# Patient Record
Sex: FEMALE | Race: WHITE | NOT HISPANIC OR LATINO | Employment: OTHER | URBAN - METROPOLITAN AREA
[De-identification: names, ages, dates, MRNs, and addresses within clinical notes are randomized per-mention and may not be internally consistent; named-entity substitution may affect disease eponyms.]

---

## 2017-03-22 ENCOUNTER — OFFICE VISIT (OUTPATIENT)
Dept: NEUROLOGY | Facility: CLINIC | Age: 82
End: 2017-03-22
Payer: MEDICARE

## 2017-03-22 VITALS
SYSTOLIC BLOOD PRESSURE: 110 MMHG | HEART RATE: 66 BPM | BODY MASS INDEX: 31.66 KG/M2 | DIASTOLIC BLOOD PRESSURE: 65 MMHG | WEIGHT: 167.56 LBS

## 2017-03-22 DIAGNOSIS — M53.86 SCIATICA ASSOCIATED WITH DISORDER OF LUMBAR SPINE: Primary | ICD-10-CM

## 2017-03-22 DIAGNOSIS — F98.4 STEREOTYPIES: ICD-10-CM

## 2017-03-22 PROCEDURE — 1159F MED LIST DOCD IN RCRD: CPT | Mod: GC,S$GLB,, | Performed by: PSYCHIATRY & NEUROLOGY

## 2017-03-22 PROCEDURE — 99214 OFFICE O/P EST MOD 30 MIN: CPT | Mod: GC,S$GLB,, | Performed by: PSYCHIATRY & NEUROLOGY

## 2017-03-22 PROCEDURE — 3074F SYST BP LT 130 MM HG: CPT | Mod: GC,S$GLB,, | Performed by: PSYCHIATRY & NEUROLOGY

## 2017-03-22 PROCEDURE — 3078F DIAST BP <80 MM HG: CPT | Mod: GC,S$GLB,, | Performed by: PSYCHIATRY & NEUROLOGY

## 2017-03-22 PROCEDURE — 1157F ADVNC CARE PLAN IN RCRD: CPT | Mod: GC,S$GLB,, | Performed by: PSYCHIATRY & NEUROLOGY

## 2017-03-22 PROCEDURE — 99999 PR PBB SHADOW E&M-EST. PATIENT-LVL III: CPT | Mod: PBBFAC,GC,, | Performed by: PSYCHIATRY & NEUROLOGY

## 2017-03-22 PROCEDURE — 1160F RVW MEDS BY RX/DR IN RCRD: CPT | Mod: GC,S$GLB,, | Performed by: PSYCHIATRY & NEUROLOGY

## 2017-03-22 NOTE — PROGRESS NOTES
Patient Name: Mary Waters  MRN: [id number]    CC: bilateral movement of feet    HPI: Mary Waters is a 85 y.o. female former  with PMhx of CVA, thyroid disease, HLD L side sciatica, presents due to abnormal shaking of both feet starting ~ 1 yr ago. Patient accompanies by her son states that it has not been getting worse but can be constant. Son states that he has noticed this since stroke in 10/2015. Patient received tPA and no evidence of diffusion restriction on MRI. Patient does have intracranial atherosclerosis and is on ASA and lipitor. MRI imaging shows significant small vessel diease that is involving bilateral frontal lobes. Patient has stereotyped movements involving her hands and feet that increase when in conversation. She also has some jaw involvement. Patient sit in wheelchair and does not ambulate much due to sciatic pain, and ppssible frontal lobe pathology???. Patient takes ditropan for urgency/incontinence. Family reports some decrease in short term memory recently, but patient is oriented x 4.       ROS:   Review of Systems   Constitutional: Negative for malaise/fatigue. Negative for weight loss.   HENT: Negative for hearing loss.   Eyes: Negative for blurred vision and double vision.   Respiratory: Negative for shortness of breath and stridor.   Cardiovascular: Negative for chest pain and palpitations.   Gastrointestinal: Negative for nausea, vomiting and constipation.   Genitourinary: Negative for frequency. + for urgency.   Musculoskeletal: Negative for joint pain. Negative for myalgias and falls.   Skin: Negative for rash.   Neurological: Negative for dizziness and tremors. Negative for focal weakness and seizures.   Endo/Heme/Allergies: Does not bruise/bleed easily.   Psychiatric/Behavioral: Negative for memory loss. Negative for depression and hallucinations. The patient is not nervous/anxious.      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       Medications    Current Outpatient Prescriptions:     alendronate (FOSAMAX) 70 MG tablet, TAKE 1 TABLET(70 MG) BY MOUTH EVERY 7 DAYS (Patient taking differently: TAKE 1 TABLET(70 MG) BY MOUTH EVERY 7 DAYS ON Saturday ), Disp: 12 tablet, Rfl: 12    aspirin 81 MG Chew, Take 1 tablet (81 mg total) by mouth once daily., Disp: , Rfl: 0    atorvastatin (LIPITOR) 80 MG tablet, Take 1 tablet (80 mg total) by mouth once daily., Disp: 30 tablet, Rfl: 6    calcium carbonate-vit D3-min 600 mg calcium- 400 unit Tab, Take 1 tablet by mouth 2 (two) times daily., Disp: , Rfl:     cephALEXin (KEFLEX) 250 MG capsule, Take 1 capsule (250 mg total) by mouth once daily., Disp: 30 capsule, Rfl: 11    conjugated estrogens (PREMARIN) vaginal cream, Use 0.5 -1 gram of estrogen cream in vagina twice a week, Disp: 30 g, Rfl: 3    CRANBERRY FRUIT CONCENTRATE (AZO CRANBERRY ORAL), Take 2 tablets by mouth once daily. , Disp: , Rfl:     escitalopram oxalate (LEXAPRO) 20 MG tablet, Take 1 tablet (20 mg total) by mouth once daily., Disp: 30 tablet, Rfl: 11    FOLIC ACID/MULTIVIT-MIN/LUTEIN (CENTRUM SILVER ORAL), Take 1 tablet by mouth once daily., Disp: , Rfl:     gabapentin (NEURONTIN) 100 MG capsule, TAKE ONE CAPSULE BY MOUTH THREE TIMES DAILY, Disp: 90 capsule, Rfl: 3    lansoprazole (PREVACID) 30 MG capsule, Take 1 capsule (30 mg total) by mouth once daily., Disp: 90 capsule, Rfl: 2    levothyroxine (SYNTHROID) 75 MCG tablet, TAKE 1 TABLET EVERY EVENING  AT  5PM, Disp: 90 tablet, Rfl: 4    miconazole nitrate  2% (MICOTIN) 2 % Oint, Apply topically 2 (two) times daily., Disp: , Rfl: 0    oxybutynin (DITROPAN-XL) 10 MG 24 hr tablet, Take 1 tablet (10 mg total) by mouth every evening., Disp: 30 tablet, Rfl: 11    psyllium (METAMUCIL) 0.52 gram capsule, Take 2 capsules by mouth once daily., Disp: , Rfl:     tramadol (ULTRAM) 50 mg tablet, Take 1 tablet (50 mg total) by mouth every 8 (eight) hours as needed for Pain (Moderate Pain)., Disp: 30 tablet, Rfl: 0    trolamine salicylate (ASPERCREME) 10 % cream, Apply topically 2 (two) times daily as needed. Apply to knees PRN, Disp: 60 g, Rfl: 0    Allergies  Review of patient's allergies indicates:   Allergen Reactions    Tizanidine Other (See Comments)     Daughter report this medication incapable of functioning.    Cortisone      Other reaction(s): Flushing (skin)    Procaine      Other reaction(s): palpitations    Penicillins Rash       Social History  Social History     Social History    Marital status:      Spouse name: N/A    Number of children: N/A    Years of education: N/A     Occupational History    Not on file.     Social History Main Topics    Smoking status: Never Smoker    Smokeless tobacco: Never Used    Alcohol use No    Drug use: No    Sexual activity: Not Currently     Other Topics Concern    Not on file     Social History Narrative       Family History  Family History   Problem Relation Age of Onset    Breast cancer Mother     Asthma Mother     Cancer Maternal Grandmother     Ovarian cancer Neg Hx     Cervical cancer Neg Hx     Endometrial cancer Neg Hx     Vaginal cancer Neg Hx        Physical Exam  /65  Pulse 66  Wt 76 kg (167 lb 8.8 oz)  LMP  (LMP Unknown)  BMI 31.66 kg/m2    General appearance: Well-developed, well-groomed.     Neurologic Exam: The patient is awake, alert and oriented. Language is fluent. Recent and remote memory are normal. Attention span and concentration are normal. Fund of knowledge is  appropriate.     Cranial nerves: pupils are round and reactive to light and accommodation. Visual fields are full to confrontation. Fundoscopic exam reveals sharp discs bilaterally, with good venous pulsations. Ocular motility is full in all cardinal positions of gaze. Facial sensation is normal to pinprick and light touch. Corneal reflexes are present bilaterally. Facial activation is symmetric. Hearing is normal bilaterally. Palate elevates symmetrically and gag reflex is intact bilaterally. Shoulder elevation is symmetric and full strength bilaterally. Tongue is midline and neck rotation strength is normal bilaterally. Neck range of motion is normal.     Motor examination of all extremities demonstrates normal bulk and tone in all four limbs. There are no atrophy or fasciculations. Strength is 5/5 in the upper and lower extremities bilaterally without pronator drift.     Sensory examination is normal to pinprick, vibration and proprioception in the upper and lower extremities bilaterally. Romberg is negative.    Deep tendon reflexes are 2+ and symmetric in the upper and lower extremities bilaterally. Toes are mute bilaterally.     Gait: Normal heel, toe, tandem, and casual gait.    Coordination: Finger to nose and heel to shin testing is normal in both upper and lower extremities. Rapid alternating movements are normal in both upper and lower extremities.     General exam  Cardiovascular: regular rate and rhythm with no murmurs, rubs or gallops. There are no carotid or vertebral artery bruits. Pulses in both upper and lower extremities are symmetric. There is no peripheral edema.   Head and neck: no cervical lymphadenopathy      Lab and Test Results    WBC   Date Value Ref Range Status   08/18/2016 6.14 3.90 - 12.70 K/uL Final   08/15/2016 6.41 3.90 - 12.70 K/uL Final   08/11/2016 6.63 3.90 - 12.70 K/uL Final     Hemoglobin   Date Value Ref Range Status   08/18/2016 11.2 (L) 12.0 - 16.0 g/dL Final   08/15/2016  11.6 (L) 12.0 - 16.0 g/dL Final   08/11/2016 11.8 (L) 12.0 - 16.0 g/dL Final     Hematocrit   Date Value Ref Range Status   08/18/2016 35.8 (L) 37.0 - 48.5 % Final   08/15/2016 36.6 (L) 37.0 - 48.5 % Final   08/11/2016 37.6 37.0 - 48.5 % Final     Platelets   Date Value Ref Range Status   08/18/2016 249 150 - 350 K/uL Final   08/15/2016 262 150 - 350 K/uL Final   08/11/2016 340 150 - 350 K/uL Final     Glucose   Date Value Ref Range Status   08/18/2016 84 70 - 110 mg/dL Final   08/15/2016 97 70 - 110 mg/dL Final   08/11/2016 85 70 - 110 mg/dL Final     Sodium   Date Value Ref Range Status   08/18/2016 138 136 - 145 mmol/L Final   08/15/2016 135 (L) 136 - 145 mmol/L Final   08/11/2016 139 136 - 145 mmol/L Final     Potassium   Date Value Ref Range Status   08/18/2016 4.5 3.5 - 5.1 mmol/L Final   08/15/2016 4.5 3.5 - 5.1 mmol/L Final   08/11/2016 4.5 3.5 - 5.1 mmol/L Final     Chloride   Date Value Ref Range Status   08/18/2016 107 95 - 110 mmol/L Final   08/15/2016 103 95 - 110 mmol/L Final   08/11/2016 104 95 - 110 mmol/L Final     CO2   Date Value Ref Range Status   08/18/2016 25 23 - 29 mmol/L Final   08/15/2016 24 23 - 29 mmol/L Final   08/11/2016 28 23 - 29 mmol/L Final     BUN, Bld   Date Value Ref Range Status   08/18/2016 14 8 - 23 mg/dL Final   08/15/2016 17 8 - 23 mg/dL Final   08/11/2016 14 8 - 23 mg/dL Final     Creatinine   Date Value Ref Range Status   08/18/2016 0.8 0.5 - 1.4 mg/dL Final   08/15/2016 0.9 0.5 - 1.4 mg/dL Final   08/11/2016 0.8 0.5 - 1.4 mg/dL Final     Calcium   Date Value Ref Range Status   08/18/2016 9.1 8.7 - 10.5 mg/dL Final   08/15/2016 9.6 8.7 - 10.5 mg/dL Final   08/11/2016 9.7 8.7 - 10.5 mg/dL Final     Magnesium   Date Value Ref Range Status   08/18/2016 1.8 1.6 - 2.6 mg/dL Final   08/15/2016 2.2 1.6 - 2.6 mg/dL Final   08/11/2016 2.2 1.6 - 2.6 mg/dL Final     Phosphorus   Date Value Ref Range Status   08/18/2016 3.5 2.7 - 4.5 mg/dL Final   08/15/2016 4.3 2.7 - 4.5 mg/dL Final    08/11/2016 3.8 2.7 - 4.5 mg/dL Final     Alkaline Phosphatase   Date Value Ref Range Status   07/27/2016 103 55 - 135 U/L Final   10/08/2015 62 55 - 135 U/L Final   10/06/2015 64 55 - 135 U/L Final     ALT   Date Value Ref Range Status   07/27/2016 16 10 - 44 U/L Final   10/08/2015 19 10 - 44 U/L Final   10/06/2015 18 10 - 44 U/L Final     AST   Date Value Ref Range Status   07/27/2016 36 10 - 40 U/L Final   10/08/2015 30 10 - 40 U/L Final   10/06/2015 24 10 - 40 U/L Final         Images: MRI Brain- small vessel disease with frontal lobe predominance     Independently reviewed     Other Tests    Assessment and Plan    Mary Waters is a 85 y.o. female with small vessel disease with stereotypy's with fidgeting in both hands and both feet. No evidence of parkinsonism. Also has gait disturbance, and urinary incontinence. No evidence of NPH on MRI.     Can consider Aricept in future   RTC mvmt disorders in 3 months- June 27th  PT for back and arm          Maddison Higgins MD  Neurology Resident   [facility] [address] [place] [address]  Pager: [phone]

## 2017-03-23 ENCOUNTER — TELEPHONE (OUTPATIENT)
Dept: NEUROLOGY | Facility: CLINIC | Age: 82
End: 2017-03-23

## 2017-03-28 NOTE — PROGRESS NOTES
Patient seen and examined.  I agree with the history, exam, assessment and plan within the resident's note as stated above.  Note has been edited by me to reflect my work and changes.    Curt Bocanegra MD, MPH  Division of Movement and Memory Disorders  [facility]

## 2017-04-24 ENCOUNTER — TELEPHONE (OUTPATIENT)
Dept: INTERNAL MEDICINE | Facility: CLINIC | Age: 82
End: 2017-04-24

## 2017-04-24 NOTE — TELEPHONE ENCOUNTER
----- Message from Kari Cox sent at 4/24/2017  4:22 PM CDT -----  Contact: self [phone]  Patient is returning a phone call.  Who left a message for the patient: Sarah   Does patient know what this is regarding:  Not sure   Comments:

## 2017-04-24 NOTE — TELEPHONE ENCOUNTER
Spoke to pt, she is asking if it's OK to take Magnilife tablets for her sciatica pain? They're made for back/leg pain.     Active ingredients:  Capsicum annuum 6X HPUS  Colocynthis 6X HPUS  Gnaphalium polycephalum 6X HPUS  Magnesia phosphorica 6X HPUS    Inactive ingredients:  Lactose  Magnesium Stearate  Microcrystalline Cellulose    Please advise

## 2017-04-24 NOTE — TELEPHONE ENCOUNTER
With all of those primarily being Herbal, I really do not have any experience to base a recommendation on.

## 2017-04-24 NOTE — TELEPHONE ENCOUNTER
Called pt, no answer- unable to leave message. A recording came over about being connected w/pt but then would disconnect me.

## 2017-04-24 NOTE — TELEPHONE ENCOUNTER
----- Message from Shelly Sears sent at 4/24/2017  1:23 PM CDT -----  Contact: call  [phone]  Patient is returning a phone call.  Who left a message for the patient: may  Does patient know what this is regarding:    Comments:

## 2017-04-24 NOTE — TELEPHONE ENCOUNTER
----- Message from Adriana Kearney sent at 4/24/2017 10:20 AM CDT -----  Contact: Pt at [phone]  Pt requesting a call back regarding [facility].

## 2017-06-12 ENCOUNTER — TELEPHONE (OUTPATIENT)
Dept: NEUROLOGY | Facility: CLINIC | Age: 82
End: 2017-06-12

## 2017-06-12 NOTE — TELEPHONE ENCOUNTER
----- Message from Pam Diane sent at 6/12/2017 11:11 AM CDT -----  Contact: pt's daughter gris [phone]  Pt's daughter Gris requests to schedule an appointment the week of July 10 th around 11:00 am. She can be reached at [phone].

## 2017-06-12 NOTE — TELEPHONE ENCOUNTER
Advised Ms. Landry that Dr. Higgins's clinic schedule is still being finalized for July and we would be able to schedule the patient by the beginning of next week.

## 2017-06-19 ENCOUNTER — PATIENT MESSAGE (OUTPATIENT)
Dept: INTERNAL MEDICINE | Facility: CLINIC | Age: 82
End: 2017-06-19

## 2017-06-20 NOTE — TELEPHONE ENCOUNTER
Pt daughter states [facility] is giving pt 1,000mg of gabapentin 3 times daily. We have it on record she is only supposed to take 100mg daily. Pt daughter is concerned this is too much. Please see portal message chain from daughter and advise.

## 2017-07-26 ENCOUNTER — OFFICE VISIT (OUTPATIENT)
Dept: INTERNAL MEDICINE | Facility: CLINIC | Age: 82
End: 2017-07-26
Payer: MEDICARE

## 2017-07-26 VITALS
OXYGEN SATURATION: 94 % | DIASTOLIC BLOOD PRESSURE: 78 MMHG | SYSTOLIC BLOOD PRESSURE: 124 MMHG | HEART RATE: 64 BPM | HEIGHT: 60 IN

## 2017-07-26 DIAGNOSIS — K21.9 GASTROESOPHAGEAL REFLUX DISEASE WITHOUT ESOPHAGITIS: ICD-10-CM

## 2017-07-26 DIAGNOSIS — M79.602 PAIN IN BOTH UPPER EXTREMITIES: ICD-10-CM

## 2017-07-26 DIAGNOSIS — E03.4 HYPOTHYROIDISM DUE TO ACQUIRED ATROPHY OF THYROID: Primary | ICD-10-CM

## 2017-07-26 DIAGNOSIS — M48.061 DEGENERATIVE LUMBAR SPINAL STENOSIS: ICD-10-CM

## 2017-07-26 DIAGNOSIS — R53.81 DEBILITY: ICD-10-CM

## 2017-07-26 DIAGNOSIS — M79.601 PAIN IN BOTH UPPER EXTREMITIES: ICD-10-CM

## 2017-07-26 PROCEDURE — 99499 UNLISTED E&M SERVICE: CPT | Mod: S$GLB,,, | Performed by: INTERNAL MEDICINE

## 2017-07-26 PROCEDURE — 99397 PER PM REEVAL EST PAT 65+ YR: CPT | Mod: S$GLB,,, | Performed by: INTERNAL MEDICINE

## 2017-07-26 PROCEDURE — 99999 PR PBB SHADOW E&M-EST. PATIENT-LVL III: CPT | Mod: PBBFAC,,, | Performed by: INTERNAL MEDICINE

## 2017-07-26 RX ORDER — LANSOPRAZOLE 30 MG/1
30 CAPSULE, DELAYED RELEASE ORAL DAILY
Qty: 90 CAPSULE | Refills: 3 | Status: SHIPPED | OUTPATIENT
Start: 2017-07-26 | End: 2018-07-24 | Stop reason: SDUPTHER

## 2017-07-26 RX ORDER — GABAPENTIN 600 MG/1
600 TABLET ORAL 3 TIMES DAILY
Qty: 90 TABLET | Refills: 11 | Status: SHIPPED | OUTPATIENT
Start: 2017-07-26 | End: 2018-07-24 | Stop reason: SDUPTHER

## 2017-07-26 NOTE — PROGRESS NOTES
Subjective:       Patient ID: Mary Waters is a 86 y.o. female.    Chief Complaint: Annual Exam    PI: Patient living in a nursing facility comes in for annual exam.  Doctors there did a chest x-ray and chemistry which were stable.  In reviewing the patient's meds she is off of Prevacid and would like to go back on it.  She's having more reflux.  She is on preventive antibiotics for UTIs.  Chronic meds for pain and neuropathy including gabapentin, Aspercreme, tramadol when necessary and lidocaine patches.  Appetite is stable.  She does have some incontinence but she says she is well cared for from the hygiene standpoint.      Review of Systems   Constitutional: Negative for appetite change, chills and fever.   HENT: Negative for sore throat.    Respiratory: Negative for cough, shortness of breath and wheezing.    Cardiovascular: Negative for chest pain and leg swelling.   Gastrointestinal: Negative for abdominal pain, constipation and diarrhea.   Genitourinary: Negative for difficulty urinating.        Incontinence, intermittent UTIs   Musculoskeletal: Positive for arthralgias (asking for more occupational physical therapy), back pain and gait problem. Negative for neck pain and neck stiffness.   Skin: Negative for rash.   Neurological: Positive for tremors and weakness.       Objective:      Physical Exam   Constitutional: She is oriented to person, place, and time. She appears well-developed and well-nourished. No distress.   Obese female in wheelchair   HENT:   Head: Normocephalic and atraumatic.   Mouth/Throat: Oropharynx is clear and moist. No oropharyngeal exudate.   Eyes: Conjunctivae are normal. Pupils are equal, round, and reactive to light. No scleral icterus.   Neck: Normal range of motion. Neck supple. No thyromegaly present.   Cardiovascular: Normal rate and regular rhythm.    No murmur heard.  Pulmonary/Chest: Effort normal and breath sounds normal. She has no wheezes.   Abdominal: Soft. Bowel  sounds are normal. She exhibits no distension. There is no tenderness.   Musculoskeletal: She exhibits tenderness (shoulders are stiff, tender.  Low back stiffness and tender).   Lymphadenopathy:     She has no cervical adenopathy.   Neurological: She is alert and oriented to person, place, and time.   Skin: No rash noted.   Psychiatric: She has a normal mood and affect.       Assessment:       1. Hypothyroidism due to acquired atrophy of thyroid    2. Gastroesophageal reflux disease without esophagitis    3. Pain in both upper extremities    4. Debility    5. Degenerative lumbar spinal stenosis        Plan:       Mary was seen today for annual exam.    Diagnoses and all orders for this visit:    Hypothyroidism due to acquired atrophy of thyroid    Gastroesophageal reflux disease without esophagitis    Pain in both upper extremities  -     Ambulatory Referral to Physical/Occupational Therapy    Debility    Degenerative lumbar spinal stenosis    Other orders  -     gabapentin (NEURONTIN) 600 MG tablet; Take 1 tablet (600 mg total) by mouth 3 (three) times daily.  -     lansoprazole (PREVACID) 30 MG capsule; Take 1 capsule (30 mg total) by mouth once daily.        follow-up in a few months

## 2017-11-07 DIAGNOSIS — N39.0 RECURRENT UTI: ICD-10-CM

## 2017-11-07 RX ORDER — CEPHALEXIN 250 MG/1
CAPSULE ORAL
Qty: 30 CAPSULE | Refills: 0 | Status: SHIPPED | OUTPATIENT
Start: 2017-11-07

## 2017-11-07 NOTE — TELEPHONE ENCOUNTER
Spoke with pt's daughter regarding scheduling her mom for a yearly follow up, daughter stated mom is in a nursing home and she needs to arrange transportation for the visit, she will look at her schedule this afternoon and is requesting someone to call her back in order to schedule the appointment.

## 2017-11-07 NOTE — TELEPHONE ENCOUNTER
----- Message from Leidy Jean NP sent at 11/7/2017 11:03 AM CST -----  Patient needs a f/u appt. With Chen-- she has not been here for a year    Thanks,  Leidy

## 2017-11-09 ENCOUNTER — TELEPHONE (OUTPATIENT)
Dept: UROGYNECOLOGY | Facility: CLINIC | Age: 82
End: 2017-11-09

## 2018-03-15 ENCOUNTER — PATIENT MESSAGE (OUTPATIENT)
Dept: INTERNAL MEDICINE | Facility: CLINIC | Age: 83
End: 2018-03-15

## 2018-03-15 DIAGNOSIS — R53.81 DEBILITY: ICD-10-CM

## 2018-03-15 DIAGNOSIS — K21.9 GASTROESOPHAGEAL REFLUX DISEASE WITHOUT ESOPHAGITIS: ICD-10-CM

## 2018-03-15 DIAGNOSIS — M48.061 DEGENERATIVE LUMBAR SPINAL STENOSIS: Primary | ICD-10-CM

## 2018-04-03 ENCOUNTER — PES CALL (OUTPATIENT)
Dept: ADMINISTRATIVE | Facility: CLINIC | Age: 83
End: 2018-04-03

## 2018-04-24 ENCOUNTER — TELEPHONE (OUTPATIENT)
Dept: ADMINISTRATIVE | Facility: CLINIC | Age: 83
End: 2018-04-24

## 2018-05-14 ENCOUNTER — TELEPHONE (OUTPATIENT)
Dept: ADMINISTRATIVE | Facility: CLINIC | Age: 83
End: 2018-05-14

## 2018-07-20 ENCOUNTER — OFFICE VISIT (OUTPATIENT)
Dept: INTERNAL MEDICINE | Facility: CLINIC | Age: 83
End: 2018-07-20
Payer: MEDICARE

## 2018-07-20 ENCOUNTER — PATIENT MESSAGE (OUTPATIENT)
Dept: INTERNAL MEDICINE | Facility: CLINIC | Age: 83
End: 2018-07-20

## 2018-07-20 ENCOUNTER — HOSPITAL ENCOUNTER (OUTPATIENT)
Dept: RADIOLOGY | Facility: HOSPITAL | Age: 83
Discharge: HOME | End: 2018-07-20
Attending: INTERNAL MEDICINE
Payer: MEDICARE

## 2018-07-20 VITALS
WEIGHT: 200 LBS | HEART RATE: 70 BPM | OXYGEN SATURATION: 98 % | TEMPERATURE: 98 F | SYSTOLIC BLOOD PRESSURE: 124 MMHG | BODY MASS INDEX: 39.06 KG/M2 | DIASTOLIC BLOOD PRESSURE: 62 MMHG

## 2018-07-20 DIAGNOSIS — R05.9 COUGH: ICD-10-CM

## 2018-07-20 DIAGNOSIS — Z00.00 ROUTINE PHYSICAL EXAMINATION: Primary | ICD-10-CM

## 2018-07-20 DIAGNOSIS — R53.81 DEBILITY: ICD-10-CM

## 2018-07-20 DIAGNOSIS — R06.2 WHEEZING: ICD-10-CM

## 2018-07-20 DIAGNOSIS — R26.81 GAIT INSTABILITY: ICD-10-CM

## 2018-07-20 DIAGNOSIS — M48.061 DEGENERATIVE LUMBAR SPINAL STENOSIS: ICD-10-CM

## 2018-07-20 PROCEDURE — 99397 PER PM REEVAL EST PAT 65+ YR: CPT | Mod: S$GLB,,, | Performed by: INTERNAL MEDICINE

## 2018-07-20 PROCEDURE — 71045 X-RAY EXAM CHEST 1 VIEW: CPT | Mod: 26,,, | Performed by: RADIOLOGY

## 2018-07-20 PROCEDURE — 71045 X-RAY EXAM CHEST 1 VIEW: CPT | Mod: TC

## 2018-07-20 PROCEDURE — 99999 PR PBB SHADOW E&M-EST. PATIENT-LVL IV: CPT | Mod: PBBFAC,,, | Performed by: INTERNAL MEDICINE

## 2018-07-20 NOTE — MEDICAL/APP STUDENT
Subjective:       Patient ID: Mary Waters is a 87 y.o. female.    Chief Complaint: Follow-up and Cough    HPI  Review of Systems   Constitutional: Negative.    HENT: Negative.    Respiratory: Positive for cough, shortness of breath and wheezing.         Cough productive of white phlegm  SOB described with exertion  Expiratory wheeze present most days, not a new symptom   Cardiovascular: Negative.    Gastrointestinal: Negative.    Endocrine: Negative.    Genitourinary: Negative for dysuria.        Overflow incontinence - can't stop, occurs couple of times a day   Musculoskeletal: Negative.    Neurological: Negative.    Hematological: Negative.    Psychiatric/Behavioral: Negative.        Objective:      Physical Exam   Constitutional: She is oriented to person, place, and time. She appears well-developed and well-nourished. No distress.   HENT:   Head: Normocephalic and atraumatic.   Neck: Normal range of motion. Neck supple.   Cardiovascular: Normal rate, regular rhythm and normal heart sounds.    Pulmonary/Chest: Effort normal. No respiratory distress. She has wheezes. She has rales.   Abdominal: Soft. Bowel sounds are normal.   Musculoskeletal: Normal range of motion.   Neurological: She is alert and oriented to person, place, and time.   Skin: Skin is warm and dry. Capillary refill takes less than 2 seconds.   Psychiatric: She has a normal mood and affect. Her behavior is normal. Judgment and thought content normal.       Assessment:       Ms Rene is here today to request a referral for Physical Therapy.   Plan:

## 2018-07-20 NOTE — PROGRESS NOTES
Subjective:       Patient ID: Mary Waters is a 87 y.o. female.    Chief Complaint: Follow-up and Cough    HPI:  87-year-old attended by her son and a caretaker here for cough and follow-up to consider physical therapy and occupational therapy.  The patient lives in health care facility but did not feel that the physical therapy and occupational therapy was adequate so few months ago had requested that I assist with home health to therefore get PT and OT.  I did paperwork and we faxed it appropriately but the patient and son are not certain that anyone cane to do this or it was not adequate.  They would like to do it again.  I explained that sometimes PT and OT or stopped when the patient is not making progress or if with can be accomplished can be done through the present facility.  They would like to proceed again so I will redo the order and see if that goes through or determine more about the evaluation.  Patient with chronic degenerative lumbar spinal stenosis, debility, constipation, reflux, mild memory impairment.  She has had a chronic cough for a while but it may be worse.  No productive sputum.  A gets better if she blows her nose.  Cough drops work.  No coughing while eating.  She has had some urinary infections in the past but says the hygiene is much better in her present facility and therefore she has not had any issues.      Cough   Associated symptoms include myalgias and postnasal drip. Pertinent negatives include no chest pain, chills, fever, headaches, rash, sore throat, shortness of breath or wheezing.     Review of Systems   Constitutional: Negative for appetite change, chills and fever.   HENT: Positive for postnasal drip. Negative for nosebleeds, sinus pain and sore throat.    Eyes: Negative for visual disturbance.   Respiratory: Positive for cough. Negative for shortness of breath and wheezing.    Cardiovascular: Negative for chest pain and leg swelling.   Gastrointestinal: Negative for  abdominal pain, constipation and diarrhea.   Genitourinary: Negative for difficulty urinating, dysuria and hematuria.   Musculoskeletal: Positive for arthralgias, back pain, gait problem and myalgias. Negative for neck pain and neck stiffness.   Skin: Negative for pallor and rash.   Neurological: Positive for weakness. Negative for headaches.   Psychiatric/Behavioral: Negative for dysphoric mood and suicidal ideas. The patient is not nervous/anxious.        Objective:      Physical Exam   Constitutional: She is oriented to person, place, and time. She appears well-developed and well-nourished. No distress.   Obese female, seated in wheelchair.  No obvious respiratory distress.  Occasional cough   HENT:   Head: Normocephalic and atraumatic.   Right Ear: External ear normal.   Left Ear: External ear normal.   Mouth/Throat: Oropharynx is clear and moist. No oropharyngeal exudate.   Eyes: Conjunctivae are normal. Pupils are equal, round, and reactive to light. No scleral icterus.   Neck: Normal range of motion. Neck supple. No thyromegaly present.   Cardiovascular: Normal rate and regular rhythm.    No murmur heard.  Pulmonary/Chest: Effort normal. She has wheezes (End expiratory and only heard in the upper central anterior chest region).   Abdominal: Soft. Bowel sounds are normal. She exhibits no distension. There is no tenderness.   Musculoskeletal: She exhibits tenderness (low back).   Lymphadenopathy:     She has no cervical adenopathy.   Neurological: She is alert and oriented to person, place, and time. Coordination abnormal.   Skin: No rash noted.   Psychiatric: She has a normal mood and affect.   Vitals reviewed.      Assessment:       1. Routine physical examination    2. Debility    3. Cough    4. Wheezing    5. Degenerative lumbar spinal stenosis    6. Gait instability        Plan:       Mary was seen today for follow-up and cough.    Diagnoses and all orders for this visit:    Routine physical  examination    Debility  -     Ambulatory referral to Home Health    Cough  -     X-Ray Chest PA And Lateral; Future    Wheezing  -     X-Ray Chest PA And Lateral; Future    Degenerative lumbar spinal stenosis  -     Ambulatory referral to Home Health    Gait instability  -     Ambulatory referral to Home Health        Respiratory symptoms may relate to allergies and postnasal drip.  Will get a chest x-ray.

## 2018-07-24 ENCOUNTER — TELEPHONE (OUTPATIENT)
Dept: INTERNAL MEDICINE | Facility: CLINIC | Age: 83
End: 2018-07-24

## 2018-07-24 RX ORDER — LANSOPRAZOLE 30 MG/1
CAPSULE, DELAYED RELEASE ORAL
Qty: 30 CAPSULE | Refills: 2 | Status: SHIPPED | OUTPATIENT
Start: 2018-07-24 | End: 2018-10-24 | Stop reason: SDUPTHER

## 2018-07-24 RX ORDER — GABAPENTIN 600 MG/1
TABLET ORAL
Qty: 90 TABLET | Refills: 3 | Status: SHIPPED | OUTPATIENT
Start: 2018-07-24 | End: 2018-11-29 | Stop reason: SDUPTHER

## 2018-07-24 NOTE — TELEPHONE ENCOUNTER
----- Message from Suzanne Sarabia sent at 7/24/2018 12:26 PM CDT -----  Contact: Sarhtak/son/ [phone]  Patient's son is requesting a call back from the doctor in regards to questions about patient medication.  Patient is in [facility].  Please advise, thank you

## 2018-07-25 NOTE — TELEPHONE ENCOUNTER
----- Message from Madelyn Morgan sent at 7/25/2018  2:22 PM CDT -----  Contact: Pt son Sarthak [phone]  Sarthak would like a call back from the nurse regarding going over the patients medications.

## 2018-08-31 ENCOUNTER — PES CALL (OUTPATIENT)
Dept: ADMINISTRATIVE | Facility: CLINIC | Age: 83
End: 2018-08-31

## 2018-10-24 RX ORDER — LANSOPRAZOLE 30 MG/1
CAPSULE, DELAYED RELEASE ORAL
Qty: 30 CAPSULE | Refills: 2 | Status: SHIPPED | OUTPATIENT
Start: 2018-10-24

## 2018-11-29 RX ORDER — GABAPENTIN 600 MG/1
TABLET ORAL
Qty: 90 TABLET | Refills: 3 | Status: SHIPPED | OUTPATIENT
Start: 2018-11-29

## 2019-01-06 ENCOUNTER — HOSPITAL ENCOUNTER (INPATIENT)
Facility: HOSPITAL | Age: 84
LOS: 3 days | Discharge: NURSING, REHAB OR LONG-TERM CARE | DRG: 202 | End: 2019-01-09
Attending: EMERGENCY MEDICINE | Admitting: EMERGENCY MEDICINE
Payer: MEDICARE

## 2019-01-06 DIAGNOSIS — M17.0 PRIMARY OSTEOARTHRITIS OF BOTH KNEES: ICD-10-CM

## 2019-01-06 DIAGNOSIS — J40 BRONCHITIS: ICD-10-CM

## 2019-01-06 DIAGNOSIS — I49.9 IRREGULAR CARDIAC RHYTHM: ICD-10-CM

## 2019-01-06 DIAGNOSIS — R06.89 RESPIRATORY INSUFFICIENCY: ICD-10-CM

## 2019-01-06 DIAGNOSIS — I49.8 SINUS ARRHYTHMIA SEEN ON ELECTROCARDIOGRAM: ICD-10-CM

## 2019-01-06 DIAGNOSIS — D64.9 ANEMIA, UNSPECIFIED TYPE: Primary | ICD-10-CM

## 2019-01-06 DIAGNOSIS — R06.02 SOB (SHORTNESS OF BREATH): ICD-10-CM

## 2019-01-06 DIAGNOSIS — J98.4 PNEUMONITIS: ICD-10-CM

## 2019-01-06 PROBLEM — D50.9 MICROCYTIC ANEMIA: Status: ACTIVE | Noted: 2019-01-06

## 2019-01-06 PROBLEM — R06.09 EXERTIONAL DYSPNEA: Status: ACTIVE | Noted: 2019-01-06

## 2019-01-06 PROBLEM — R06.2 WHEEZING: Status: ACTIVE | Noted: 2019-01-06

## 2019-01-06 LAB
ABO + RH BLD: NORMAL
ALBUMIN SERPL BCP-MCNC: 3.2 G/DL
ALP SERPL-CCNC: 86 U/L
ALT SERPL W/O P-5'-P-CCNC: 13 U/L
ANION GAP SERPL CALC-SCNC: 10 MMOL/L
ANISOCYTOSIS BLD QL SMEAR: SLIGHT
AST SERPL-CCNC: 28 U/L
BASOPHILS # BLD AUTO: 0.02 K/UL
BASOPHILS NFR BLD: 0.4 %
BILIRUB SERPL-MCNC: 0.3 MG/DL
BLD GP AB SCN CELLS X3 SERPL QL: NORMAL
BUN SERPL-MCNC: 8 MG/DL
CALCIUM SERPL-MCNC: 9.1 MG/DL
CHLORIDE SERPL-SCNC: 103 MMOL/L
CO2 SERPL-SCNC: 26 MMOL/L
CREAT SERPL-MCNC: 0.7 MG/DL
DIFFERENTIAL METHOD: ABNORMAL
EOSINOPHIL # BLD AUTO: 0.2 K/UL
EOSINOPHIL NFR BLD: 3.6 %
ERYTHROCYTE [DISTWIDTH] IN BLOOD BY AUTOMATED COUNT: 19.8 %
EST. GFR  (AFRICAN AMERICAN): >60 ML/MIN/1.73 M^2
EST. GFR  (NON AFRICAN AMERICAN): >60 ML/MIN/1.73 M^2
FERRITIN SERPL-MCNC: 14 NG/ML
GLUCOSE SERPL-MCNC: 120 MG/DL
HCT VFR BLD AUTO: 26 %
HGB BLD-MCNC: 6.7 G/DL
HYPOCHROMIA BLD QL SMEAR: ABNORMAL
IMM GRANULOCYTES # BLD AUTO: 0.03 K/UL
IMM GRANULOCYTES NFR BLD AUTO: 0.6 %
IRON SERPL-MCNC: 14 UG/DL
LYMPHOCYTES # BLD AUTO: 1.2 K/UL
LYMPHOCYTES NFR BLD: 25.8 %
MCH RBC QN AUTO: 20 PG
MCHC RBC AUTO-ENTMCNC: 25.8 G/DL
MCV RBC AUTO: 78 FL
MONOCYTES # BLD AUTO: 0.6 K/UL
MONOCYTES NFR BLD: 13.2 %
NEUTROPHILS # BLD AUTO: 2.6 K/UL
NEUTROPHILS NFR BLD: 56.4 %
NRBC BLD-RTO: 0 /100 WBC
OVALOCYTES BLD QL SMEAR: ABNORMAL
PLATELET # BLD AUTO: 328 K/UL
PLATELET BLD QL SMEAR: ABNORMAL
PMV BLD AUTO: 10.1 FL
POIKILOCYTOSIS BLD QL SMEAR: SLIGHT
POLYCHROMASIA BLD QL SMEAR: ABNORMAL
POTASSIUM SERPL-SCNC: 3.9 MMOL/L
PROCALCITONIN SERPL IA-MCNC: 0.06 NG/ML
PROT SERPL-MCNC: 7.1 G/DL
RBC # BLD AUTO: 3.35 M/UL
SATURATED IRON: 3 %
SODIUM SERPL-SCNC: 139 MMOL/L
TOTAL IRON BINDING CAPACITY: 447 UG/DL
TRANSFERRIN SERPL-MCNC: 302 MG/DL
TSH SERPL DL<=0.005 MIU/L-ACNC: 0.82 UIU/ML
WBC # BLD AUTO: 4.69 K/UL

## 2019-01-06 PROCEDURE — 93010 EKG 12-LEAD: ICD-10-PCS | Mod: HCNC,,, | Performed by: INTERNAL MEDICINE

## 2019-01-06 PROCEDURE — 99285 EMERGENCY DEPT VISIT HI MDM: CPT | Mod: ,,, | Performed by: EMERGENCY MEDICINE

## 2019-01-06 PROCEDURE — 94640 AIRWAY INHALATION TREATMENT: CPT | Mod: HCNC

## 2019-01-06 PROCEDURE — 85025 COMPLETE CBC W/AUTO DIFF WBC: CPT | Mod: HCNC

## 2019-01-06 PROCEDURE — 86920 COMPATIBILITY TEST SPIN: CPT | Mod: HCNC

## 2019-01-06 PROCEDURE — 94761 N-INVAS EAR/PLS OXIMETRY MLT: CPT | Mod: HCNC

## 2019-01-06 PROCEDURE — 84145 PROCALCITONIN (PCT): CPT | Mod: HCNC

## 2019-01-06 PROCEDURE — 82728 ASSAY OF FERRITIN: CPT | Mod: HCNC

## 2019-01-06 PROCEDURE — 83540 ASSAY OF IRON: CPT | Mod: HCNC

## 2019-01-06 PROCEDURE — 87186 SC STD MICRODIL/AGAR DIL: CPT | Mod: HCNC

## 2019-01-06 PROCEDURE — P9021 RED BLOOD CELLS UNIT: HCPCS | Mod: HCNC

## 2019-01-06 PROCEDURE — 25000242 PHARM REV CODE 250 ALT 637 W/ HCPCS: Mod: HCNC | Performed by: EMERGENCY MEDICINE

## 2019-01-06 PROCEDURE — 93005 ELECTROCARDIOGRAM TRACING: CPT | Mod: HCNC

## 2019-01-06 PROCEDURE — 25000003 PHARM REV CODE 250: Mod: HCNC | Performed by: STUDENT IN AN ORGANIZED HEALTH CARE EDUCATION/TRAINING PROGRAM

## 2019-01-06 PROCEDURE — 80053 COMPREHEN METABOLIC PANEL: CPT | Mod: HCNC

## 2019-01-06 PROCEDURE — 25500020 PHARM REV CODE 255: Mod: HCNC | Performed by: EMERGENCY MEDICINE

## 2019-01-06 PROCEDURE — 84443 ASSAY THYROID STIM HORMONE: CPT | Mod: HCNC

## 2019-01-06 PROCEDURE — 99285 EMERGENCY DEPT VISIT HI MDM: CPT | Mod: 25,HCNC

## 2019-01-06 PROCEDURE — 36430 TRANSFUSION BLD/BLD COMPNT: CPT | Mod: HCNC

## 2019-01-06 PROCEDURE — 87040 BLOOD CULTURE FOR BACTERIA: CPT | Mod: 59,HCNC

## 2019-01-06 PROCEDURE — 36415 COLL VENOUS BLD VENIPUNCTURE: CPT | Mod: HCNC

## 2019-01-06 PROCEDURE — 25000242 PHARM REV CODE 250 ALT 637 W/ HCPCS: Mod: HCNC | Performed by: STUDENT IN AN ORGANIZED HEALTH CARE EDUCATION/TRAINING PROGRAM

## 2019-01-06 PROCEDURE — 99285 PR EMERGENCY DEPT VISIT,LEVEL V: ICD-10-PCS | Mod: ,,, | Performed by: EMERGENCY MEDICINE

## 2019-01-06 PROCEDURE — 93010 ELECTROCARDIOGRAM REPORT: CPT | Mod: HCNC,,, | Performed by: INTERNAL MEDICINE

## 2019-01-06 PROCEDURE — 11000001 HC ACUTE MED/SURG PRIVATE ROOM: Mod: HCNC

## 2019-01-06 PROCEDURE — 87077 CULTURE AEROBIC IDENTIFY: CPT | Mod: HCNC

## 2019-01-06 PROCEDURE — 86901 BLOOD TYPING SEROLOGIC RH(D): CPT | Mod: HCNC

## 2019-01-06 RX ORDER — LEVOTHYROXINE SODIUM 75 UG/1
75 TABLET ORAL
Status: DISCONTINUED | OUTPATIENT
Start: 2019-01-07 | End: 2019-01-09 | Stop reason: HOSPADM

## 2019-01-06 RX ORDER — IBUPROFEN 200 MG
16 TABLET ORAL
Status: DISCONTINUED | OUTPATIENT
Start: 2019-01-06 | End: 2019-01-09 | Stop reason: HOSPADM

## 2019-01-06 RX ORDER — FERROUS SULFATE 325(65) MG
325 TABLET, DELAYED RELEASE (ENTERIC COATED) ORAL DAILY
Status: DISCONTINUED | OUTPATIENT
Start: 2019-01-06 | End: 2019-01-09 | Stop reason: HOSPADM

## 2019-01-06 RX ORDER — LEVOFLOXACIN 5 MG/ML
750 INJECTION, SOLUTION INTRAVENOUS
Status: DISCONTINUED | OUTPATIENT
Start: 2019-01-06 | End: 2019-01-09

## 2019-01-06 RX ORDER — HYDROCODONE BITARTRATE AND ACETAMINOPHEN 500; 5 MG/1; MG/1
TABLET ORAL
Status: DISCONTINUED | OUTPATIENT
Start: 2019-01-06 | End: 2019-01-09

## 2019-01-06 RX ORDER — ATORVASTATIN CALCIUM 20 MG/1
80 TABLET, FILM COATED ORAL DAILY
Status: DISCONTINUED | OUTPATIENT
Start: 2019-01-07 | End: 2019-01-09 | Stop reason: HOSPADM

## 2019-01-06 RX ORDER — PANTOPRAZOLE SODIUM 40 MG/1
40 TABLET, DELAYED RELEASE ORAL DAILY
Status: DISCONTINUED | OUTPATIENT
Start: 2019-01-06 | End: 2019-01-09 | Stop reason: HOSPADM

## 2019-01-06 RX ORDER — IBUPROFEN 200 MG
24 TABLET ORAL
Status: DISCONTINUED | OUTPATIENT
Start: 2019-01-06 | End: 2019-01-09 | Stop reason: HOSPADM

## 2019-01-06 RX ORDER — ALBUTEROL SULFATE 2.5 MG/.5ML
2.5 SOLUTION RESPIRATORY (INHALATION)
Status: COMPLETED | OUTPATIENT
Start: 2019-01-06 | End: 2019-01-06

## 2019-01-06 RX ORDER — ONDANSETRON 2 MG/ML
8 INJECTION INTRAMUSCULAR; INTRAVENOUS EVERY 8 HOURS PRN
Status: DISCONTINUED | OUTPATIENT
Start: 2019-01-06 | End: 2019-01-09 | Stop reason: HOSPADM

## 2019-01-06 RX ORDER — ACETAMINOPHEN 325 MG/1
650 TABLET ORAL EVERY 8 HOURS PRN
Status: DISCONTINUED | OUTPATIENT
Start: 2019-01-06 | End: 2019-01-09 | Stop reason: HOSPADM

## 2019-01-06 RX ORDER — HYDROCODONE BITARTRATE AND ACETAMINOPHEN 500; 5 MG/1; MG/1
TABLET ORAL ONCE
Status: CANCELLED | OUTPATIENT
Start: 2019-01-06 | End: 2019-01-06

## 2019-01-06 RX ORDER — TROLAMINE SALICYLATE 10 G/100G
CREAM TOPICAL 2 TIMES DAILY PRN
Status: DISCONTINUED | OUTPATIENT
Start: 2019-01-06 | End: 2019-01-09 | Stop reason: HOSPADM

## 2019-01-06 RX ORDER — GLUCAGON 1 MG
1 KIT INJECTION
Status: DISCONTINUED | OUTPATIENT
Start: 2019-01-06 | End: 2019-01-09 | Stop reason: HOSPADM

## 2019-01-06 RX ORDER — FERROUS SULFATE, DRIED 160(50) MG
1 TABLET, EXTENDED RELEASE ORAL 2 TIMES DAILY
Status: DISCONTINUED | OUTPATIENT
Start: 2019-01-06 | End: 2019-01-07

## 2019-01-06 RX ORDER — OXYBUTYNIN CHLORIDE 10 MG/1
10 TABLET, EXTENDED RELEASE ORAL NIGHTLY
Status: DISCONTINUED | OUTPATIENT
Start: 2019-01-06 | End: 2019-01-08

## 2019-01-06 RX ORDER — SODIUM CHLORIDE 0.9 % (FLUSH) 0.9 %
5 SYRINGE (ML) INJECTION
Status: DISCONTINUED | OUTPATIENT
Start: 2019-01-06 | End: 2019-01-09 | Stop reason: HOSPADM

## 2019-01-06 RX ORDER — SODIUM CHLORIDE 0.9 % (FLUSH) 0.9 %
5 SYRINGE (ML) INJECTION
Status: CANCELLED | OUTPATIENT
Start: 2019-01-06

## 2019-01-06 RX ORDER — IPRATROPIUM BROMIDE AND ALBUTEROL SULFATE 2.5; .5 MG/3ML; MG/3ML
3 SOLUTION RESPIRATORY (INHALATION)
Status: DISCONTINUED | OUTPATIENT
Start: 2019-01-06 | End: 2019-01-07

## 2019-01-06 RX ADMIN — ALBUTEROL SULFATE 2.5 MG: 2.5 SOLUTION RESPIRATORY (INHALATION) at 02:01

## 2019-01-06 RX ADMIN — FERROUS SULFATE TAB EC 325 MG (65 MG FE EQUIVALENT) 325 MG: 325 (65 FE) TABLET DELAYED RESPONSE at 09:01

## 2019-01-06 RX ADMIN — GABAPENTIN 600 MG: 100 CAPSULE ORAL at 09:01

## 2019-01-06 RX ADMIN — IOHEXOL 100 ML: 350 INJECTION, SOLUTION INTRAVENOUS at 03:01

## 2019-01-06 RX ADMIN — OXYBUTYNIN CHLORIDE 10 MG: 10 TABLET, FILM COATED, EXTENDED RELEASE ORAL at 09:01

## 2019-01-06 RX ADMIN — OYSTER SHELL CALCIUM WITH VITAMIN D 1 TABLET: 500; 200 TABLET, FILM COATED ORAL at 09:01

## 2019-01-06 RX ADMIN — IPRATROPIUM BROMIDE AND ALBUTEROL SULFATE 3 ML: .5; 3 SOLUTION RESPIRATORY (INHALATION) at 08:01

## 2019-01-06 NOTE — ED TRIAGE NOTES
"Pt sent from [facility] for "abnormal chest xray"; pt reports intermittent SOB over last 2 days, denies SOB at this time; pt denies fevers, cough; pt A&Ox4; expiratory wheezing auscultated   "

## 2019-01-06 NOTE — ED PROVIDER NOTES
Encounter Date: 1/6/2019       History     Chief Complaint   Patient presents with    Abnormal chest x ray     complaining of sob 2 days ago, no complaints now     Time patient was seen by the provider: 2:12 PM      The patient is a 87 y.o. female with co-morbidities including: DJD, GERD, DVT, UTI, Acute Ischemic stroke who presents to the ED with a complaint of abnormal CXR. Pt c/o SOB and wheezes. Went to an outpt facility and had CXR done that showed immediate mediastinal fullness or mass. Will get a CT. Pt experiences expiratory wheezing. Denies cp, fever, cough.         The history is provided by the patient.     Review of patient's allergies indicates:   Allergen Reactions    Tizanidine Other (See Comments)     Daughter report this medication incapable of functioning.    Cortisone      Other reaction(s): Flushing (skin)    Procaine      Other reaction(s): palpitations    Penicillins Rash     Past Medical History:   Diagnosis Date    Acute ischemic stroke 10/5/2015    s/p tPA     Anxiety 11/18/2013    Chronic constipation 3/20/2013    Cystocele 3/20/2013    Deep vein thrombosis     DJD (degenerative joint disease) of knee     GERD (gastroesophageal reflux disease)     Hypothyroid     Lumbar spinal stenosis 2/24/2015    Rectocele 3/20/2013    UTI (urinary tract infection) 10/2015    Enterococcus    Vaginal vault prolapse, posthysterectomy 3/20/2013     Past Surgical History:   Procedure Laterality Date    BLADDER SUSPENSION  1962    BREAST BIOPSY      BREAST SURGERY      biopsy    COLPOCLEISIS N/A 3/21/2014    Performed [facility] at [facility] OR    CYSTOSCOPY N/A 3/21/2014    Performed by [facility] at [facility] OR    CYSTOURETHROSCOPY  3/21/2014    HIP SURGERY      HYSTERECTOMY      TVH/ovaries remain (prolapse)    JOINT REPLACEMENT  2006    right hip  x4    PERINEORRHAPHY  3/21/2014    SUPRAPUBIC SLING (SPARC/BIOARC PROCEDURE) N/A 3/21/2014    Performed by Soni RUST  MD [facility] at [facility] OR    THYROIDECTOMY, PARTIAL      TOTLA COLPOCLEISIS WITH COLPECTOMY  3/21/2014    TRANSOBTURATOR TENSION-FREE MIDURETHRAL SLING  3/21/2014     Family History   Problem Relation Age of Onset    Breast cancer Mother     Asthma Mother     Cancer Maternal Grandmother     Ovarian cancer Neg Hx     Cervical cancer Neg Hx     Endometrial cancer Neg Hx     Vaginal cancer Neg Hx      Social History     Tobacco Use    Smoking status: Never Smoker    Smokeless tobacco: Never Used   Substance Use Topics    Alcohol use: No     Alcohol/week: 0.0 oz    Drug use: No     Review of Systems   Constitutional: Negative for fever.   Respiratory: Positive for shortness of breath and wheezing. Negative for cough.    Cardiovascular: Negative for chest pain.   All other systems reviewed and are negative.      Physical Exam     Initial Vitals [01/06/19 1408]   BP Pulse Resp Temp SpO2   (!) 113/53 70 16 98.8 °F (37.1 °C) 98 %      MAP       --         Physical Exam    Constitutional: She appears well-developed and well-nourished. No distress.   HENT:   Head: Normocephalic and atraumatic.   Right Ear: External ear normal.   Left Ear: External ear normal.   Mouth/Throat: Oropharynx is clear and moist.   Eyes: EOM are normal. Pupils are equal, round, and reactive to light.   Neck: Normal range of motion. Neck supple.   Cardiovascular: Normal rate, regular rhythm and normal heart sounds. Exam reveals no gallop and no friction rub.    No murmur heard.  Pulmonary/Chest: No respiratory distress. She has wheezes (mild expiratory wheeze noted). She has no rhonchi. She has no rales.   Abdominal: Soft. She exhibits no distension. There is no tenderness.   Musculoskeletal: Normal range of motion.   Neurological: She is alert and oriented to person, place, and time. She has normal strength. No cranial nerve deficit or sensory deficit.   Skin: Skin is warm and dry.   Psychiatric: Her behavior is normal. Thought content  normal.         ED Course   Procedures  Labs Reviewed - No data to display       Imaging Results    None                            ED Course as of Jan 06 1838   Sun Jan 06, 2019   1837 This is the attending provider This is a very pleasant 87-year-old female came here for an abnormal chest x-ray she is complaining of some wheezing and nonfocal weakness give her some breathing treatments initiated a workup chest a CT scan showed some pneumonitis like findings patient had a low H&H of 6.7 hemoglobin with a negative rectal exam we will admit the patient for transfusion.  I just got the CT report back after many hours of waiting so that is why there is a delay and some antibiotics  [SA]      ED Course User Index  [SA] Nathaniel Bird MD     Clinical Impression:   Anemia, pneumonitis, COPD                             Nathaniel Bird MD  01/06/19 5260

## 2019-01-06 NOTE — ED NOTES
LOC: The patient is awake, alert, and aware of environment. The patient is oriented x 3 and speaking appropriately.   APPEARANCE: No acute distress noted.   PSYCHOSOCIAL: Patient is calm and cooperative.   SKIN: The skin is warm, dry.   RESPIRATORY: Airway is open and patent. Bilateral chest rise and fall. Respirations are spontaneous, even and unlabored. Normal effort and rate noted. No accessory muscle use noted.   CARDIAC: Patient has a normal rate and rhythm. Denies chest pain or SOB.   ABDOMEN: Soft and non tender to palpation. No distention noted.   URINARY:  Voids independently.   EXTREMITIES:  NEUROLOGIC: Eyes open spontaneously. Speech clear. Tolerating saliva secretions well. Able to follow commands, demonstrating ability to actively and appropriately communicate within context of current conversation. Symmetrical facial muscles. Moving all extremities well. Movement is purposeful.   MUSCULOSKELETAL: No obvious deformities noted.

## 2019-01-06 NOTE — ED NOTES
Pt. Resting in bed in NAD. RR e/u. Continuous cardiac, BP, and O2 monitoring in progress. VS being monitored continuously per MD orders. Pt. Offered bathroom assistance and denies need at this time. Explanation of care/wait provided. Bed in low, locked position with rails up and call bell in reach. Will continue to monitor.

## 2019-01-07 ENCOUNTER — TELEPHONE (OUTPATIENT)
Dept: INTERNAL MEDICINE | Facility: CLINIC | Age: 84
End: 2019-01-07

## 2019-01-07 PROBLEM — E61.1 IRON DEFICIENCY: Status: ACTIVE | Noted: 2019-01-06

## 2019-01-07 PROBLEM — J18.9 PNEUMONIA OF BOTH LUNGS DUE TO INFECTIOUS ORGANISM: Status: ACTIVE | Noted: 2019-01-06

## 2019-01-07 PROBLEM — J40 BRONCHITIS: Status: ACTIVE | Noted: 2019-01-06

## 2019-01-07 PROBLEM — R06.89 RESPIRATORY INSUFFICIENCY: Status: ACTIVE | Noted: 2019-01-06

## 2019-01-07 PROBLEM — I49.8 SINUS ARRHYTHMIA: Status: ACTIVE | Noted: 2019-01-07

## 2019-01-07 LAB
ALBUMIN SERPL BCP-MCNC: 3.2 G/DL
ALP SERPL-CCNC: 87 U/L
ALT SERPL W/O P-5'-P-CCNC: 13 U/L
ANION GAP SERPL CALC-SCNC: 8 MMOL/L
ANISOCYTOSIS BLD QL SMEAR: SLIGHT
ANISOCYTOSIS BLD QL SMEAR: SLIGHT
APTT BLDCRRT: 22.9 SEC
AST SERPL-CCNC: 27 U/L
BACTERIA #/AREA URNS AUTO: ABNORMAL /HPF
BASO STIPL BLD QL SMEAR: ABNORMAL
BASOPHILS # BLD AUTO: 0.02 K/UL
BASOPHILS # BLD AUTO: 0.03 K/UL
BASOPHILS NFR BLD: 0.2 %
BASOPHILS NFR BLD: 0.5 %
BILIRUB SERPL-MCNC: 0.5 MG/DL
BILIRUB UR QL STRIP: NEGATIVE
BNP SERPL-MCNC: 99 PG/ML
BUN SERPL-MCNC: 9 MG/DL
BURR CELLS BLD QL SMEAR: ABNORMAL
CALCIUM SERPL-MCNC: 9.2 MG/DL
CHLORIDE SERPL-SCNC: 104 MMOL/L
CLARITY UR REFRACT.AUTO: CLEAR
CO2 SERPL-SCNC: 26 MMOL/L
COLOR UR AUTO: YELLOW
CREAT SERPL-MCNC: 0.7 MG/DL
DIFFERENTIAL METHOD: ABNORMAL
DIFFERENTIAL METHOD: ABNORMAL
EOSINOPHIL # BLD AUTO: 0.2 K/UL
EOSINOPHIL # BLD AUTO: 0.2 K/UL
EOSINOPHIL NFR BLD: 1.9 %
EOSINOPHIL NFR BLD: 3.3 %
ERYTHROCYTE [DISTWIDTH] IN BLOOD BY AUTOMATED COUNT: 19.6 %
ERYTHROCYTE [DISTWIDTH] IN BLOOD BY AUTOMATED COUNT: 19.6 %
EST. GFR  (AFRICAN AMERICAN): >60 ML/MIN/1.73 M^2
EST. GFR  (NON AFRICAN AMERICAN): >60 ML/MIN/1.73 M^2
GLUCOSE SERPL-MCNC: 90 MG/DL
GLUCOSE UR QL STRIP: NEGATIVE
HCT VFR BLD AUTO: 29.7 %
HCT VFR BLD AUTO: 30.5 %
HGB BLD-MCNC: 8.4 G/DL
HGB BLD-MCNC: 8.5 G/DL
HGB UR QL STRIP: ABNORMAL
HYPOCHROMIA BLD QL SMEAR: ABNORMAL
HYPOCHROMIA BLD QL SMEAR: ABNORMAL
IMM GRANULOCYTES # BLD AUTO: 0.03 K/UL
IMM GRANULOCYTES # BLD AUTO: 0.18 K/UL
IMM GRANULOCYTES NFR BLD AUTO: 0.3 %
IMM GRANULOCYTES NFR BLD AUTO: 3 %
INR PPP: 1
KETONES UR QL STRIP: NEGATIVE
LEUKOCYTE ESTERASE UR QL STRIP: ABNORMAL
LYMPHOCYTES # BLD AUTO: 1.3 K/UL
LYMPHOCYTES # BLD AUTO: 1.8 K/UL
LYMPHOCYTES NFR BLD: 19.8 %
LYMPHOCYTES NFR BLD: 22 %
MAGNESIUM SERPL-MCNC: 2.1 MG/DL
MCH RBC QN AUTO: 21.6 PG
MCH RBC QN AUTO: 22 PG
MCHC RBC AUTO-ENTMCNC: 27.5 G/DL
MCHC RBC AUTO-ENTMCNC: 28.6 G/DL
MCV RBC AUTO: 77 FL
MCV RBC AUTO: 78 FL
MICROSCOPIC COMMENT: ABNORMAL
MONOCYTES # BLD AUTO: 0.7 K/UL
MONOCYTES # BLD AUTO: 0.8 K/UL
MONOCYTES NFR BLD: 12 %
MONOCYTES NFR BLD: 9.1 %
NEUTROPHILS # BLD AUTO: 3.6 K/UL
NEUTROPHILS # BLD AUTO: 6.3 K/UL
NEUTROPHILS NFR BLD: 59.2 %
NEUTROPHILS NFR BLD: 68.7 %
NITRITE UR QL STRIP: NEGATIVE
NRBC BLD-RTO: 0 /100 WBC
NRBC BLD-RTO: 0 /100 WBC
OVALOCYTES BLD QL SMEAR: ABNORMAL
OVALOCYTES BLD QL SMEAR: ABNORMAL
PH UR STRIP: 7 [PH] (ref 5–8)
PHOSPHATE SERPL-MCNC: 3.5 MG/DL
PLATELET # BLD AUTO: 297 K/UL
PLATELET # BLD AUTO: 312 K/UL
PLATELET BLD QL SMEAR: ABNORMAL
PMV BLD AUTO: 10.2 FL
PMV BLD AUTO: 10.5 FL
POIKILOCYTOSIS BLD QL SMEAR: SLIGHT
POIKILOCYTOSIS BLD QL SMEAR: SLIGHT
POLYCHROMASIA BLD QL SMEAR: ABNORMAL
POLYCHROMASIA BLD QL SMEAR: ABNORMAL
POTASSIUM SERPL-SCNC: 4.2 MMOL/L
PROT SERPL-MCNC: 6.9 G/DL
PROT UR QL STRIP: NEGATIVE
PROTHROMBIN TIME: 10.9 SEC
RBC # BLD AUTO: 3.86 M/UL
RBC # BLD AUTO: 3.89 M/UL
RBC #/AREA URNS AUTO: 7 /HPF (ref 0–4)
SODIUM SERPL-SCNC: 138 MMOL/L
SP GR UR STRIP: 1.01 (ref 1–1.03)
SQUAMOUS #/AREA URNS AUTO: 0 /HPF
URN SPEC COLLECT METH UR: ABNORMAL
WBC # BLD AUTO: 6.1 K/UL
WBC # BLD AUTO: 9.1 K/UL
WBC #/AREA URNS AUTO: >100 /HPF (ref 0–5)
WBC CLUMPS UR QL AUTO: ABNORMAL

## 2019-01-07 PROCEDURE — 94640 AIRWAY INHALATION TREATMENT: CPT | Mod: HCNC

## 2019-01-07 PROCEDURE — 63600175 PHARM REV CODE 636 W HCPCS: Mod: HCNC | Performed by: STUDENT IN AN ORGANIZED HEALTH CARE EDUCATION/TRAINING PROGRAM

## 2019-01-07 PROCEDURE — 80053 COMPREHEN METABOLIC PANEL: CPT | Mod: HCNC

## 2019-01-07 PROCEDURE — 83880 ASSAY OF NATRIURETIC PEPTIDE: CPT | Mod: HCNC

## 2019-01-07 PROCEDURE — 94761 N-INVAS EAR/PLS OXIMETRY MLT: CPT | Mod: HCNC

## 2019-01-07 PROCEDURE — 11000001 HC ACUTE MED/SURG PRIVATE ROOM: Mod: HCNC

## 2019-01-07 PROCEDURE — 85610 PROTHROMBIN TIME: CPT | Mod: HCNC

## 2019-01-07 PROCEDURE — 25000003 PHARM REV CODE 250: Mod: HCNC | Performed by: STUDENT IN AN ORGANIZED HEALTH CARE EDUCATION/TRAINING PROGRAM

## 2019-01-07 PROCEDURE — 87086 URINE CULTURE/COLONY COUNT: CPT | Mod: HCNC

## 2019-01-07 PROCEDURE — 25000242 PHARM REV CODE 250 ALT 637 W/ HCPCS: Mod: HCNC | Performed by: STUDENT IN AN ORGANIZED HEALTH CARE EDUCATION/TRAINING PROGRAM

## 2019-01-07 PROCEDURE — 63600175 PHARM REV CODE 636 W HCPCS: Mod: HCNC | Performed by: EMERGENCY MEDICINE

## 2019-01-07 PROCEDURE — 99223 PR INITIAL HOSPITAL CARE,LEVL III: ICD-10-PCS | Mod: HCNC,AI,GC, | Performed by: HOSPITALIST

## 2019-01-07 PROCEDURE — 27000221 HC OXYGEN, UP TO 24 HOURS: Mod: HCNC

## 2019-01-07 PROCEDURE — 99223 1ST HOSP IP/OBS HIGH 75: CPT | Mod: HCNC,AI,GC, | Performed by: HOSPITALIST

## 2019-01-07 PROCEDURE — 87632 RESP VIRUS 6-11 TARGETS: CPT | Mod: HCNC

## 2019-01-07 PROCEDURE — 81001 URINALYSIS AUTO W/SCOPE: CPT | Mod: HCNC

## 2019-01-07 PROCEDURE — 85730 THROMBOPLASTIN TIME PARTIAL: CPT | Mod: HCNC

## 2019-01-07 PROCEDURE — 36415 COLL VENOUS BLD VENIPUNCTURE: CPT | Mod: HCNC

## 2019-01-07 PROCEDURE — 85025 COMPLETE CBC W/AUTO DIFF WBC: CPT | Mod: HCNC

## 2019-01-07 PROCEDURE — 97165 OT EVAL LOW COMPLEX 30 MIN: CPT | Mod: HCNC

## 2019-01-07 PROCEDURE — 83735 ASSAY OF MAGNESIUM: CPT | Mod: HCNC

## 2019-01-07 PROCEDURE — 84100 ASSAY OF PHOSPHORUS: CPT | Mod: HCNC

## 2019-01-07 RX ORDER — BUTYROSPERMUM PARKII(SHEA BUTTER), SIMMONDSIA CHINENSIS (JOJOBA) SEED OIL, ALOE BARBADENSIS LEAF EXTRACT .01; 1; 3.5 G/100G; G/100G; G/100G
250 LIQUID TOPICAL DAILY
COMMUNITY

## 2019-01-07 RX ORDER — CEPHALEXIN 250 MG/1
250 CAPSULE ORAL DAILY
Status: DISCONTINUED | OUTPATIENT
Start: 2019-01-08 | End: 2019-01-08

## 2019-01-07 RX ORDER — LIDOCAINE 50 MG/G
1 PATCH TOPICAL
COMMUNITY

## 2019-01-07 RX ORDER — IPRATROPIUM BROMIDE AND ALBUTEROL SULFATE 2.5; .5 MG/3ML; MG/3ML
3 SOLUTION RESPIRATORY (INHALATION) EVERY 4 HOURS PRN
Status: DISCONTINUED | OUTPATIENT
Start: 2019-01-07 | End: 2019-01-07

## 2019-01-07 RX ORDER — IPRATROPIUM BROMIDE AND ALBUTEROL SULFATE 2.5; .5 MG/3ML; MG/3ML
3 SOLUTION RESPIRATORY (INHALATION) EVERY 4 HOURS
Status: DISCONTINUED | OUTPATIENT
Start: 2019-01-07 | End: 2019-01-09

## 2019-01-07 RX ORDER — ALBUTEROL SULFATE 0.83 MG/ML
2.5 SOLUTION RESPIRATORY (INHALATION) EVERY 6 HOURS PRN
COMMUNITY

## 2019-01-07 RX ORDER — ACETAMINOPHEN 325 MG/1
650 TABLET ORAL 3 TIMES DAILY
Status: ON HOLD | COMMUNITY
End: 2019-01-09 | Stop reason: SDUPTHER

## 2019-01-07 RX ORDER — ENOXAPARIN SODIUM 100 MG/ML
40 INJECTION SUBCUTANEOUS EVERY 24 HOURS
Status: DISCONTINUED | OUTPATIENT
Start: 2019-01-07 | End: 2019-01-09 | Stop reason: HOSPADM

## 2019-01-07 RX ORDER — CETIRIZINE HYDROCHLORIDE 10 MG/1
10 TABLET ORAL DAILY PRN
COMMUNITY

## 2019-01-07 RX ORDER — DEXTROMETHORPHAN HBR. AND GUAIFENESIN 10; 100 MG/5ML; MG/5ML
5 SOLUTION ORAL EVERY 6 HOURS PRN
COMMUNITY

## 2019-01-07 RX ORDER — FUROSEMIDE 10 MG/ML
40 INJECTION INTRAMUSCULAR; INTRAVENOUS DAILY
Status: DISCONTINUED | OUTPATIENT
Start: 2019-01-07 | End: 2019-01-09 | Stop reason: HOSPADM

## 2019-01-07 RX ADMIN — IPRATROPIUM BROMIDE AND ALBUTEROL SULFATE 3 ML: .5; 3 SOLUTION RESPIRATORY (INHALATION) at 09:01

## 2019-01-07 RX ADMIN — LEVOTHYROXINE SODIUM 75 MCG: 75 TABLET ORAL at 06:01

## 2019-01-07 RX ADMIN — FUROSEMIDE 40 MG: 10 INJECTION, SOLUTION INTRAVENOUS at 04:01

## 2019-01-07 RX ADMIN — GABAPENTIN 600 MG: 100 CAPSULE ORAL at 09:01

## 2019-01-07 RX ADMIN — PSYLLIUM HUSK 1 PACKET: 3.4 POWDER ORAL at 09:01

## 2019-01-07 RX ADMIN — OYSTER SHELL CALCIUM WITH VITAMIN D 1 TABLET: 500; 200 TABLET, FILM COATED ORAL at 09:01

## 2019-01-07 RX ADMIN — IPRATROPIUM BROMIDE AND ALBUTEROL SULFATE 3 ML: .5; 3 SOLUTION RESPIRATORY (INHALATION) at 08:01

## 2019-01-07 RX ADMIN — ENOXAPARIN SODIUM 40 MG: 100 INJECTION SUBCUTANEOUS at 04:01

## 2019-01-07 RX ADMIN — GABAPENTIN 600 MG: 100 CAPSULE ORAL at 03:01

## 2019-01-07 RX ADMIN — LEVOFLOXACIN 750 MG: 750 INJECTION, SOLUTION INTRAVENOUS at 06:01

## 2019-01-07 RX ADMIN — LEVOFLOXACIN 750 MG: 750 INJECTION, SOLUTION INTRAVENOUS at 02:01

## 2019-01-07 RX ADMIN — ATORVASTATIN CALCIUM 80 MG: 20 TABLET, FILM COATED ORAL at 09:01

## 2019-01-07 RX ADMIN — PANTOPRAZOLE SODIUM 40 MG: 40 TABLET, DELAYED RELEASE ORAL at 02:01

## 2019-01-07 RX ADMIN — IPRATROPIUM BROMIDE AND ALBUTEROL SULFATE 3 ML: .5; 3 SOLUTION RESPIRATORY (INHALATION) at 03:01

## 2019-01-07 RX ADMIN — OXYBUTYNIN CHLORIDE 10 MG: 10 TABLET, FILM COATED, EXTENDED RELEASE ORAL at 09:01

## 2019-01-07 RX ADMIN — FERROUS SULFATE TAB EC 325 MG (65 MG FE EQUIVALENT) 325 MG: 325 (65 FE) TABLET DELAYED RESPONSE at 09:01

## 2019-01-07 RX ADMIN — PANTOPRAZOLE SODIUM 40 MG: 40 TABLET, DELAYED RELEASE ORAL at 09:01

## 2019-01-07 NOTE — NURSING
Stopped transfusion of 1/2 units of PRBCs upon completion, H&H 8.5 & 29.7. MD DC'd 2nd transfusion. No s/s of transfusion reaction, VSS WCTM.    Pt is refusing to where visi monitoring and states the DOUGLAS's cause increased neuropathic pain. SCDs in place and on.

## 2019-01-07 NOTE — ASSESSMENT & PLAN NOTE
Etiology unclear. Not on blood thinners, denies melena, denies NSAID use. STEPHANIE negative; will get FOBT as pt says she has not had c-scope before. Hb 6.7 on admit compared to 11.4 2 years ago (most recent value)    Microcytic (previous Hb normocytic) with Iron panel + TIBC + ferritin suggestive of iron deficiency anemia    Plan  - Transfused 1U PRBC, repeat CBC 8.5 from 6.7  - F/U FOBT  - Continue home PPI  - Transfuse for Hb <7  - Replace iron

## 2019-01-07 NOTE — ED NOTES
Attempt to call report unsuccessful; receiving floor unable to take report; will attempt again shortly

## 2019-01-07 NOTE — ASSESSMENT & PLAN NOTE
New onset; per pt has been happening for the last few days. Suspect symptomatic anemia vs pneumonia as cause. With new onset wheezing. Negative procalcitonin, no WBC elevation, no fever.    Plan  - Duonebs Q4H PRN wheezing  - Correct anemia  - F/U resp viral panel PCR

## 2019-01-07 NOTE — PROGRESS NOTES
Patient very confused and having visual hallucinations.  Spoke with son who stated that he talked to her on the phone and said that she is not normally this confused. He said she takes Keflex daily for chronic UTI.  Called and spoke with IM4 regarding the above.  Ordered UA as one hasn't been done since she arrived.  Also put a pure wick on patient after the in and out cath to get a urine sample.

## 2019-01-07 NOTE — PLAN OF CARE
Problem: Adult Inpatient Plan of Care  Goal: Plan of Care Review  Outcome: Ongoing (interventions implemented as appropriate)  Pt began having auditory and visual hallucinations throughout the night stating she heard and saw adults and a little girl having a party in her room. Pt free of any injury or falls, VSS, skin intact. Scheduled and PRN meds given as ordered or when requested. Demonstrated ability to use call light when needed. Bed locked in lowest position. Care plan reviewed w/ patient and education given. Pt is not demonstrating any overt S/S of pain or distress at this time. Will continue to monitor.

## 2019-01-07 NOTE — ED NOTES
Pt. Resting in bed in NAD. RR e/u. Continuous cardiac, BP, and O2 monitoring in progress. VS being monitored continuously per MD orders. Pt. Offered bathroom assistance and denies need at this time. Explanation of care/wait provided. Bed in low, locked position with rails up and call bell in reach. Will continue to monitor.      Pt requesting something to eat; physician aware

## 2019-01-07 NOTE — H&P
"[facility] Medicine  History & Physical    Patient Name: Mary Waters  MRN: [id number]  Admission Date: 1/6/2019  Attending Physician: Matteo Shepherd MD   Primary Care Provider: Omar Arthur MD    [facility] Medicine Team: [facility] 4 Janet Thakur MD     Patient information was obtained from patient, relative(s), past medical records and ER records.     Subjective:     Principal Problem:Microcytic anemia    Chief Complaint:   Chief Complaint   Patient presents with    Abnormal chest x ray     complaining of sob 2 days ago, no complaints now        HPI: 87F with DJD, GERD, chronic UTI on suppressive cephalexin transferred from [facility] for 2 days of exertional dyspnea and wheezing. Pt says that she does not feel any dyspnea or chest pain at rest, but that she has been feeling more winded after minimal exertion (mostly bedbound at baseline), e.g. reaching across the bed for her tray, which is abnormal for her. She denies any fever, chills, body aches, fatigue, or hemoptysis, but says that she continues to have a cough, which she has had for several years.    Per NH report, pt was hypoxic to the mid-80s with O2 rising to low 90s with duonebs; in the ED pt's O2 sat was 92-98% on room air. CT was performed (NH CXR read recommended CT to further characterize consolidations); CT read as "multifocal subsegmental densities within the posterior right upper lobe, lingula, and left lower lobe not definitely seen on prior exam." Pt was afebrile, vitals were normal, WBC was 4.69; however, Hb was 6.7 (compared to 11.4 in 2016). STEPHANIE was negative, pt denies any melena, hematochezia, or taking blood thinners. Transfused 1U PRBC in the ED and admitted with symptomatic anemia as well as new-onset wheezing with pneumonitis vs PNA on CT.    Pt is a never-smoker, denies alcohol or illicit drug use.    Past Medical History:   Diagnosis Date    Acute ischemic stroke 10/5/2015    " s/p tPA     Anxiety 11/18/2013    Chronic constipation 3/20/2013    Cystocele 3/20/2013    Deep vein thrombosis     DJD (degenerative joint disease) of knee     GERD (gastroesophageal reflux disease)     Hypothyroid     Lumbar spinal stenosis 2/24/2015    Rectocele 3/20/2013    UTI (urinary tract infection) 10/2015    Enterococcus    Vaginal vault prolapse, posthysterectomy 3/20/2013       Past Surgical History:   Procedure Laterality Date    BLADDER SUSPENSION  1962    BREAST BIOPSY      BREAST SURGERY      biopsy    COLPOCLEISIS N/A 3/21/2014    Performed by [facility]iqu[facility] at [facility] OR    CYSTOSCOPY N/A 3/21/2014    Performed by [facility] at [facility] OR    CYSTOURETHROSCOPY  3/21/2014    HIP SURGERY      HYSTERECTOMY      TVH/ovaries remain (prolapse)    JOINT REPLACEMENT  2006    right hip  x4    PERINEORRHAPHY  3/21/2014    SUPRAPUBIC SLING (SPARC/BIOARC PROCEDURE) N/A 3/21/2014    Performed by [facility] MD at [facility] OR    THYROIDECTOMY, PARTIAL      TOTLA COLPOCLEISIS WITH COLPECTOMY  3/21/2014    TRANSOBTURATOR TENSION-FREE MIDURETHRAL SLING  3/21/2014       Review of patient's allergies indicates:   Allergen Reactions    Tizanidine Other (See Comments)     Daughter report this medication incapable of functioning.    Cortisone      Other reaction(s): Flushing (skin)    Procaine      Other reaction(s): palpitations    Penicillins Rash       No current facility-administered medications on file prior to encounter.      Current Outpatient Medications on File Prior to Encounter   Medication Sig    alendronate (FOSAMAX) 70 MG tablet TAKE 1 TABLET(70 MG) BY MOUTH EVERY 7 DAYS (Patient taking differently: TAKE 1 TABLET(70 MG) BY MOUTH EVERY 7 DAYS ON Saturday )    atorvastatin (LIPITOR) 80 MG tablet Take 1 tablet (80 mg total) by mouth once daily.    calcium carbonate-vit D3-min 600 mg calcium- 400 unit Tab Take 1 tablet by mouth 2 (two) times daily.    cephALEXin  (KEFLEX) 250 MG capsule TAKE 1 CAPSULE BY MOUTH ONCE DAILY    conjugated estrogens (PREMARIN) vaginal cream Use 0.5 -1 gram of estrogen cream in vagina twice a week    CRANBERRY FRUIT CONCENTRATE (AZO CRANBERRY ORAL) Take 2 tablets by mouth once daily.     escitalopram oxalate (LEXAPRO) 20 MG tablet Take 1 tablet (20 mg total) by mouth once daily.    FOLIC ACID/MULTIVIT-MIN/LUTEIN (CENTRUM SILVER ORAL) Take 1 tablet by mouth once daily.    gabapentin (NEURONTIN) 600 MG tablet TAKE 1 TABLET BY MOUTH 3 TIMES DAILY    lansoprazole (PREVACID) 30 MG capsule TAKE 1 CAPSULE BY MOUTH ONCE DAILY    levothyroxine (SYNTHROID) 75 MCG tablet TAKE 1 TABLET EVERY EVENING  AT  5PM    miconazole nitrate 2% (MICOTIN) 2 % Oint Apply topically 2 (two) times daily.    oxybutynin (DITROPAN-XL) 10 MG 24 hr tablet Take 1 tablet (10 mg total) by mouth every evening.    psyllium (METAMUCIL) 0.52 gram capsule Take 2 capsules by mouth once daily.    tramadol (ULTRAM) 50 mg tablet Take 1 tablet (50 mg total) by mouth every 8 (eight) hours as needed for Pain (Moderate Pain).    trolamine salicylate (ASPERCREME) 10 % cream Apply topically 2 (two) times daily as needed. Apply to knees PRN     Family History     Problem Relation (Age of Onset)    Asthma Mother    Breast cancer Mother    Cancer Maternal Grandmother        Tobacco Use    Smoking status: Never Smoker    Smokeless tobacco: Never Used   Substance and Sexual Activity    Alcohol use: No     Alcohol/week: 0.0 oz    Drug use: No    Sexual activity: Not Currently     Review of Systems   Constitutional: Negative for activity change, appetite change, chills, diaphoresis, fever and unexpected weight change.   HENT: Negative for congestion.    Eyes: Negative for visual disturbance.   Respiratory: Positive for cough (Chronic, >6 years), shortness of breath (Exertional) and wheezing. Negative for choking and chest tightness.    Cardiovascular: Negative for chest pain, palpitations  and leg swelling.   Gastrointestinal: Negative for abdominal distention, abdominal pain, anal bleeding, blood in stool, constipation, diarrhea, nausea, rectal pain and vomiting.   Musculoskeletal: Negative for arthralgias, back pain, joint swelling, neck pain and neck stiffness.   Skin: Negative for wound.   Neurological: Negative for headaches.   Psychiatric/Behavioral: Negative for sleep disturbance.     Objective:     Vital Signs (Most Recent):  Temp: 98 °F (36.7 °C) (01/06/19 2200)  Pulse: 79 (01/06/19 2200)  Resp: (!) 22 (01/06/19 2200)  BP: (!) 147/75 (01/06/19 2200)  SpO2: 95 % (01/06/19 2200) Vital Signs (24h Range):  Temp:  [98 °F (36.7 °C)-98.8 °F (37.1 °C)] 98 °F (36.7 °C)  Pulse:  [70-90] 79  Resp:  [16-24] 22  SpO2:  [92 %-98 %] 95 %  BP: ()/(50-75) 147/75     Weight: 89.6 kg (197 lb 9.6 oz)  Body mass index is 36.14 kg/m².    Physical Exam   Constitutional: She is oriented to person, place, and time. She appears well-developed. No distress.   HENT:   Head: Normocephalic and atraumatic.   Right Ear: External ear normal.   Left Ear: External ear normal.   Nose: Nose normal.   Eyes: EOM are normal. Pupils are equal, round, and reactive to light.   Neck: No JVD present. No tracheal deviation present.   Cardiovascular: Normal rate and regular rhythm.   No murmur heard.  Pulmonary/Chest: Effort normal. No stridor. No respiratory distress. She has wheezes (Coarse, diffuse). She has rales (RUL, absent otherwise). She exhibits no tenderness.   Abdominal: Soft. Bowel sounds are normal. She exhibits no distension. There is no tenderness. No hernia.   Musculoskeletal: She exhibits no edema.   Neurological: She is alert and oriented to person, place, and time. No cranial nerve deficit.   Skin: Capillary refill takes less than 2 seconds. No rash noted. She is not diaphoretic.   Psychiatric: She has a normal mood and affect. Judgment normal.   Vitals reviewed.        CRANIAL NERVES     CN III, IV, VI   Pupils  "are equal, round, and reactive to light.  Extraocular motions are normal.        Significant Labs:   CBC:   Recent Labs   Lab 01/06/19  1430   WBC 4.69   HGB 6.7*   HCT 26.0*        CMP:   Recent Labs   Lab 01/06/19  1430      K 3.9      CO2 26   *   BUN 8   CREATININE 0.7   CALCIUM 9.1   PROT 7.1   ALBUMIN 3.2*   BILITOT 0.3   ALKPHOS 86   AST 28   ALT 13   ANIONGAP 10   EGFRNONAA >60.0       Significant Imaging: I have reviewed all pertinent imaging results/findings within the past 24 hours.    Assessment/Plan:     * Microcytic anemia    Etiology unclear. Not on blood thinners, denies melena, denies NSAID use. STEPHANIE negative; will get FOBT as pt says she has not had c-scope before. Hb 6.7 on admit compared to 11.4 2 years ago (most recent value)    Microcytic (previous Hb normocytic) with Iron panel + TIBC + ferritin suggestive of iron deficiency anemia    Plan  - Transfused 1U PRBC, repeat CBC 8.5 from 6.7  - F/U FOBT  - Continue home PPI  - Transfuse for Hb <7  - Replace iron     Exertional dyspnea    New onset; per pt has been happening for the last few days. Suspect symptomatic anemia vs pneumonia as cause. With new onset wheezing. Negative procalcitonin, no WBC elevation, no fever.    Plan  - Duonebs Q4H PRN wheezing  - Correct anemia  - F/U resp viral panel PCR     Sinus arrhythmia    Unclear etiology / significance. Noted on telemetry, confirmed on EKG. Historically pt is in sinus rhythm; denies recent or current episodes of chest pain / hx MI or arrhythmia hx.    Plan  - Keep on telemetry  - Monitor electrolytes, correct imbalances     Pneumonitis    Per CT read "There are multifocal subsegmental densities within the posterior right upper lobe, lingula, and left lower lobe not definitely seen on prior exam;" additionally, multiple old nodules and micronodules are stable on CT. Pt is a never-smoker, procalcitonin is negative, but does have new wheezing.    Plan  - Cover with CAP as " infectious pneumonitis (pneumonia) suggested on CT  - Duonebs PRN  - F/U Resp Viral Panel  - If pt fails to respond to antibiotics or no viral explanation is found, may consider other causes such as vasculitis, ILD, aspiration/chemical pneumonitis (no hx dysphagia or aspiration)     Wheezing    Duonebs Q4H PRN     Hypothyroidism due to acquired atrophy of thyroid    Home meds - Synthroid 75 mcg    Plan  - Continue home synthroid 75 mcg     Debility    Plan  - PT/OT consult     Gastroesophageal reflux disease without esophagitis    Home meds - Lansoprazole 30 mg daily    Plan  - Continue PPI with protonix (formulary alternative) 40 mg daily     Urge incontinence    Home meds: Oxybutynin 10 mg daily    Plan  - Continue home meds       VTE Risk Mitigation (From admission, onward)        Ordered     Place sequential compression device  Until discontinued      01/06/19 1949     Place DOUGLAS hose  Until discontinued      01/06/19 1949     IP VTE HIGH RISK PATIENT  Once      01/06/19 1949             Janet Thakur MD  Department of Hospital Medicine   [facility]

## 2019-01-07 NOTE — TELEPHONE ENCOUNTER
----- Message from Mehnaz Seth sent at 1/7/2019  4:30 PM CST -----  Contact: Sarthak(son) [phone]  Sarthak is requesting a call from the office. Patients son stated that his mother is not getting all of her medications since she's been in the hospital.     Please advise,thanks

## 2019-01-07 NOTE — TELEPHONE ENCOUNTER
----- Message from Asia Lennon sent at 1/7/2019  9:42 AM CST -----  Contact: Sarthak (Son) [phone]  Sarthak is requesting a call back to discuss the patients current medication list . Sarthak would like to discuss all of her medication & what they are used for along with wether they can be altered. The patient has been admitted to the hospital and will be treated for a lung infection.

## 2019-01-07 NOTE — ASSESSMENT & PLAN NOTE
"Per CT read "There are multifocal subsegmental densities within the posterior right upper lobe, lingula, and left lower lobe not definitely seen on prior exam;" additionally, multiple old nodules and micronodules are stable on CT. Pt is a never-smoker, procalcitonin is negative, but does have new wheezing.    Plan  - Cover with CAP as infectious pneumonitis (pneumonia) suggested on CT  - Duonebs PRN  - F/U Resp Viral Panel  - If pt fails to respond to antibiotics or no viral explanation is found, may consider other causes such as vasculitis, ILD, aspiration/chemical pneumonitis (no hx dysphagia or aspiration)  "

## 2019-01-07 NOTE — HPI
"87F with DJD, GERD, chronic UTI on suppressive cephalexin transferred from [facility] for 2 days of exertional dyspnea and wheezing. Pt says that she does not feel any dyspnea or chest pain at rest, but that she has been feeling more winded after minimal exertion (mostly bedbound at baseline), e.g. reaching across the bed for her tray, which is abnormal for her. She denies any fever, chills, body aches, fatigue, or hemoptysis, but says that she continues to have a cough, which she has had for several years.    Per NH report, pt was hypoxic to the mid-80s with O2 rising to low 90s with duonebs; in the ED pt's O2 sat was 92-98% on room air. CT was performed (NH CXR read recommended CT to further characterize consolidations); CT read as "multifocal subsegmental densities within the posterior right upper lobe, lingula, and left lower lobe not definitely seen on prior exam." Pt was afebrile, vitals were normal, WBC was 4.69; however, Hb was 6.7 (compared to 11.4 in 2016). STEPHANIE was negative, pt denies any melena, hematochezia, or taking blood thinners. Transfused 1U PRBC in the ED and admitted with symptomatic anemia as well as new-onset wheezing with pneumonitis vs PNA on CT.    Pt is a never-smoker, denies alcohol or illicit drug use.  "

## 2019-01-07 NOTE — PHARMACY MED REC
"    Admission Medication Reconciliation - Pharmacy Consult Note    Admission Medication Reconciliation - Pharmacy Consult Note    The home medication history was taken by Ivonne Mortensen CPhT.  Based on information gathered and subsequent review by the clinical pharmacist, the items below may need attention.     You may go to "Admission" then "Reconcile Home Medications" tabs to review and/or act upon these items.     Potentially problematic discrepancies with current MAR  o Patient IS taking the following which was not ordered upon admit  o Cephalexin 250 mg po qd for UTI prophylaxis     o Patient IS NOT taking the following which was ordered upon admit  o Oxybutynin 10 mg qpm    Please address this information as you see fit.  Feel free to contact us if you have any questions or require assistance.      Gaby Adams, PharmD  EXT [id number]          .  .            "

## 2019-01-07 NOTE — ASSESSMENT & PLAN NOTE
Home meds - Lansoprazole 30 mg daily    Plan  - Continue PPI with protonix (formulary alternative) 40 mg daily

## 2019-01-07 NOTE — TELEPHONE ENCOUNTER
Spoke to pt son Sarthak and he states he does not believe his mother is receiving all of her medications while in the hospital. He states she is still having trouble breathing and is hallucinating. He is requesting a phone call from PCP

## 2019-01-07 NOTE — ED NOTES
Admitting team at bedside to assess pt; pt resting quietly at this time; awaiting room assignment to be ready; will continue to monitor and provide care

## 2019-01-07 NOTE — PLAN OF CARE
Problem: Occupational Therapy Goal  Goal: Occupational Therapy Goal  Goals to be met by: 1/17     Patient will increase functional independence with ADLs by performing:    UE Dressing with Moderate Assistance.  Grooming while supine with Set-up Assistance.  Rolling to Right with Minimal Assistance.   Rolling to left with Stand by assistance  Upper extremity exercise program x15 reps per handout, with independence.    Outcome: Ongoing (interventions implemented as appropriate)  OT eval completed.

## 2019-01-07 NOTE — PT/OT/SLP EVAL
Occupational Therapy   Evaluation    Name: Mary Waters  MRN: [id number]  Admitting Diagnosis:  Bronchitis      Recommendations:     Discharge Recommendations: nursing facility, basic  Discharge Equipment Recommendations:  none  Barriers to discharge:  None    History:     Occupational Profile:  Living Environment & PLOF: Pt resides at [facility].  Pt was able to roll to the right independently & to the left with some assistance. Pt was up in w/c most of the day however used alisa lift for all transfers from supine.  Pt did not sit EOB.  Pt has not walked or transferred in standing in ~ 1 year.  Pt would eat breakfast in bed & start gown from supine & would need (A) for guiding gown down back.  Pt with toileting via diapers while supine & with Max (A) with bathing via whirlpool.  Pt was independent with grooming & eating.  Pt is a retired , , & medical technologist.  Pt enjoys Bingo.  Equipment Used at Home:  (lift device, w/c, hospital bed)  Assistance upon Discharge: NH    Past Medical History:   Diagnosis Date    Acute ischemic stroke 10/5/2015    s/p tPA     Anxiety 11/18/2013    Chronic constipation 3/20/2013    Cystocele 3/20/2013    Deep vein thrombosis     DJD (degenerative joint disease) of knee     GERD (gastroesophageal reflux disease)     Hypothyroid     Lumbar spinal stenosis 2/24/2015    Rectocele 3/20/2013    UTI (urinary tract infection) 10/2015    Enterococcus    Vaginal vault prolapse, posthysterectomy 3/20/2013       Past Surgical History:   Procedure Laterality Date    BLADDER SUSPENSION  1962    BREAST BIOPSY      BREAST SURGERY      biopsy    COLPOCLEISIS N/A 3/21/2014    [facility] OR    CYSTOSCOPY N/A 3/21/2014    [facility] OR    CYSTOURETHROSCOPY  3/21/2014    HIP SURGERY      HYSTERECTOMY      TVH/ovaries remain (prolapse)    JOINT REPLACEMENT  2006    right hip  x4     PERINEORRHAPHY  3/21/2014    SUPRAPUBIC SLING (SPARC/BIOARC PROCEDURE) N/A 3/21/2014    [facility] OR    THYROIDECTOMY, PARTIAL      TOTLA COLPOCLEISIS WITH COLPECTOMY  3/21/2014    TRANSOBTURATOR TENSION-FREE MIDURETHRAL SLING  3/21/2014       Subjective     Chief Complaint: SOB/coughing  Patient/Family Comments/goals: none stated    Pain/Comfort:  · Pain Rating 1: 0/10  · Pain Rating Post-Intervention 1: 0/10    Patients cultural, spiritual, Gnosticist conflicts given the current situation: no    Objective:     Communicated with: RN prior to session.  Patient found supine in bed with no family present and oxygen, telemetry upon OT entry to room.    General Precautions: Standard, fall     Occupational Performance:    Bed Mobility:    · Patient completed Rolling/Turning to Left with  maximal assistance  · Patient completed Rolling/Turning to Right with moderate assistance  · Patient completed Scooting/Bridging with total assistance drawsheet transfer up HOB while supine    Activities of Daily Living:  · Grooming: stand by assistance while supine    Cognitive/Visual Perceptual:  Cognitive/Psychosocial Skills:     -       Oriented to: Person, Place, Time and Situation   -       Follows Commands/attention:Follows one-step commands  -       Communication: clear/fluent  -       Memory: questionable  -       Safety awareness/insight to disability: intact   -       Mood/Affect/Coping skills/emotional control: Appropriate to situation    Physical Exam:  Sensation:    -       Intact  Dominant hand:    -       right  Upper Extremity Range of Motion:     -       Right Upper Extremity: WFL except 60* shoulder flexion  -       Left Upper Extremity: WFL except 90* shoulder flexion  Upper Extremity Strength:    -       Right Upper Extremity: WFL except 3-/5 shoulder flexion  -       Left Upper Extremity: WFL except 3-/5 shoulder flexion   Strength:    -       Right Upper Extremity: WFL  -       " Left Upper Extremity: WFL    [facility] 6 Click ADL:  AMPA Total Score: 10    Treatment & Education:  Provided education regarding role of OT, POC, & discharge recommendations with pt verbalizing understanding.  Pt had no further questions & when asked whether there were any concerns pt reported none.      Education:    Patient left supine with all lines intact, call button in reach, bed alarm on, RN notified and white board updated.    Assessment:     Mary Waters is a 87 y.o. female with a medical diagnosis of Bronchitis.  She presents with the following performance deficits affecting function: weakness, impaired endurance, impaired self care skills, impaired functional mobilty, decreased upper extremity function.      Rehab Prognosis: limited; patient would benefit from acute skilled OT services to address these deficits and reach maximum level of function.         Clinical Decision Makin.  OT Low:  "Pt evaluation falls under low complexity for evaluation coding due to performance deficits noted in 1-3 areas as stated above and 0 co-morbities affecting current functional status. Data obtained from problem focused assessments. No modifications or assistance was required for completion of evaluation. Only brief occupational profile and history review completed."     Plan:     Patient to be seen 2 x/week to address the above listed problems via self-care/home management, therapeutic activities, therapeutic exercises  · Plan of Care Expires: 19  · Plan of Care Reviewed with: patient    This Plan of care has been discussed with the patient who was involved in its development and understands and is in agreement with the identified goals and treatment plan    GOALS:   Multidisciplinary Problems     Occupational Therapy Goals        Problem: Occupational Therapy Goal    Goal Priority Disciplines Outcome Interventions   Occupational Therapy Goal     OT, PT/OT Ongoing (interventions implemented as " appropriate)    Description:  Goals to be met by: 1/17     Patient will increase functional independence with ADLs by performing:    UE Dressing with Moderate Assistance.  Grooming while supine with Set-up Assistance.  Rolling to Right with Minimal Assistance.   Rolling to left with Stand by assistance  Upper extremity exercise program x15 reps per handout, with independence.                      Time Tracking:     OT Date of Treatment: 01/07/19  OT Start Time: 0957  OT Stop Time: 1028  OT Total Time (min): 31 min    Billable Minutes:Evaluation 31    KAMLESH Burger  1/7/2019

## 2019-01-07 NOTE — ASSESSMENT & PLAN NOTE
Unclear etiology / significance. Noted on telemetry, confirmed on EKG. Historically pt is in sinus rhythm; denies recent or current episodes of chest pain / hx MI or arrhythmia hx.    Plan  - Keep on telemetry  - Monitor electrolytes, correct imbalances

## 2019-01-07 NOTE — ED NOTES
Physician at bedside to discuss test results and plan of care; pt tolerating transfusion well; will continue to monitor and provide care

## 2019-01-08 LAB
ALBUMIN SERPL BCP-MCNC: 3.3 G/DL
ALP SERPL-CCNC: 85 U/L
ALT SERPL W/O P-5'-P-CCNC: 13 U/L
ANION GAP SERPL CALC-SCNC: 9 MMOL/L
ANISOCYTOSIS BLD QL SMEAR: SLIGHT
ASCENDING AORTA: 3.06 CM
AST SERPL-CCNC: 30 U/L
AV INDEX (PROSTH): 0.71
AV MEAN GRADIENT: 5.82 MMHG
AV PEAK GRADIENT: 14.44 MMHG
AV VALVE AREA: 1.97 CM2
BASOPHILS # BLD AUTO: 0.03 K/UL
BASOPHILS NFR BLD: 0.4 %
BILIRUB SERPL-MCNC: 0.4 MG/DL
BSA FOR ECHO PROCEDURE: 1.94 M2
BUN SERPL-MCNC: 9 MG/DL
CALCIUM SERPL-MCNC: 9.6 MG/DL
CHLORIDE SERPL-SCNC: 99 MMOL/L
CO2 SERPL-SCNC: 30 MMOL/L
CREAT SERPL-MCNC: 0.8 MG/DL
CV ECHO LV RWT: 0.56 CM
DIFFERENTIAL METHOD: ABNORMAL
DOP CALC AO PEAK VEL: 1.9 M/S
DOP CALC AO VTI: 34.2 CM
DOP CALC LVOT AREA: 2.77 CM2
DOP CALC LVOT DIAMETER: 1.88 CM
DOP CALC LVOT STROKE VOLUME: 67.34 CM3
DOP CALCLVOT PEAK VEL VTI: 24.27 CM
E WAVE DECELERATION TIME: 251.1 MSEC
E/A RATIO: 0.53
E/E' RATIO: 9.33
ECHO LV POSTERIOR WALL: 1.08 CM (ref 0.6–1.1)
ENTEROVIRUS: NOT DETECTED
EOSINOPHIL # BLD AUTO: 0.1 K/UL
EOSINOPHIL NFR BLD: 1.8 %
ERYTHROCYTE [DISTWIDTH] IN BLOOD BY AUTOMATED COUNT: 20 %
EST. GFR  (AFRICAN AMERICAN): >60 ML/MIN/1.73 M^2
EST. GFR  (NON AFRICAN AMERICAN): >60 ML/MIN/1.73 M^2
FRACTIONAL SHORTENING: 29 % (ref 28–44)
GLUCOSE SERPL-MCNC: 93 MG/DL
HCT VFR BLD AUTO: 30 %
HGB BLD-MCNC: 8.2 G/DL
HUMAN BOCAVIRUS: NOT DETECTED
HUMAN CORONAVIRUS, COMMON COLD VIRUS: NOT DETECTED
IMM GRANULOCYTES # BLD AUTO: 0.04 K/UL
IMM GRANULOCYTES NFR BLD AUTO: 0.6 %
INFLUENZA A - H1N1-09: NOT DETECTED
INTERVENTRICULAR SEPTUM: 1.05 CM (ref 0.6–1.1)
LA MAJOR: 5 CM
LA MINOR: 4.8 CM
LA WIDTH: 4.04 CM
LEFT ATRIUM SIZE: 4.35 CM
LEFT ATRIUM VOLUME INDEX: 38.6 ML/M2
LEFT ATRIUM VOLUME: 73.17 CM3
LEFT INTERNAL DIMENSION IN SYSTOLE: 2.75 CM (ref 2.1–4)
LEFT VENTRICLE DIASTOLIC VOLUME INDEX: 34.12 ML/M2
LEFT VENTRICLE DIASTOLIC VOLUME: 64.73 ML
LEFT VENTRICLE MASS INDEX: 69.6 G/M2
LEFT VENTRICLE SYSTOLIC VOLUME INDEX: 14.9 ML/M2
LEFT VENTRICLE SYSTOLIC VOLUME: 28.3 ML
LEFT VENTRICULAR INTERNAL DIMENSION IN DIASTOLE: 3.87 CM (ref 3.5–6)
LEFT VENTRICULAR MASS: 132.09 G
LV LATERAL E/E' RATIO: 7.78
LV SEPTAL E/E' RATIO: 11.67
LYMPHOCYTES # BLD AUTO: 1.9 K/UL
LYMPHOCYTES NFR BLD: 28.5 %
MAGNESIUM SERPL-MCNC: 2 MG/DL
MCH RBC QN AUTO: 21.5 PG
MCHC RBC AUTO-ENTMCNC: 27.3 G/DL
MCV RBC AUTO: 79 FL
MONOCYTES # BLD AUTO: 0.8 K/UL
MONOCYTES NFR BLD: 11.5 %
MV PEAK A VEL: 1.32 M/S
MV PEAK E VEL: 0.7 M/S
NEUTROPHILS # BLD AUTO: 3.8 K/UL
NEUTROPHILS NFR BLD: 57.2 %
NRBC BLD-RTO: 0 /100 WBC
OVALOCYTES BLD QL SMEAR: ABNORMAL
PARAINFLUENZA: NOT DETECTED
PHOSPHATE SERPL-MCNC: 4 MG/DL
PISA TR MAX VEL: 2.96 M/S
PLATELET # BLD AUTO: 314 K/UL
PLATELET BLD QL SMEAR: ABNORMAL
PMV BLD AUTO: 11 FL
POIKILOCYTOSIS BLD QL SMEAR: SLIGHT
POTASSIUM SERPL-SCNC: 3.5 MMOL/L
PROT SERPL-MCNC: 7.2 G/DL
RA MAJOR: 4.46 CM
RA PRESSURE: 3 MMHG
RA WIDTH: 3.77 CM
RBC # BLD AUTO: 3.82 M/UL
RIGHT VENTRICULAR END-DIASTOLIC DIMENSION: 3.32 CM
RVP - ADENOVIRUS: NOT DETECTED
RVP - HUMAN METAPNEUMOVIRUS (HMPV): NOT DETECTED
RVP - INFLUENZA A: NOT DETECTED
RVP - INFLUENZA B: NOT DETECTED
RVP - RESPIRATORY SYNCTIAL VIRUS (RSV) A: NOT DETECTED
RVP - RESPIRATORY VIRAL PANEL, SOURCE: NORMAL
RVP - RHINOVIRUS: NOT DETECTED
SINUS: 3.13 CM
SODIUM SERPL-SCNC: 138 MMOL/L
STJ: 2.17 CM
TDI LATERAL: 0.09
TDI SEPTAL: 0.06
TDI: 0.08
TR MAX PG: 35.05 MMHG
TV REST PULMONARY ARTERY PRESSURE: 38 MMHG
WBC # BLD AUTO: 6.71 K/UL

## 2019-01-08 PROCEDURE — 94640 AIRWAY INHALATION TREATMENT: CPT | Mod: HCNC

## 2019-01-08 PROCEDURE — 36415 COLL VENOUS BLD VENIPUNCTURE: CPT | Mod: HCNC

## 2019-01-08 PROCEDURE — 85025 COMPLETE CBC W/AUTO DIFF WBC: CPT | Mod: HCNC

## 2019-01-08 PROCEDURE — 94761 N-INVAS EAR/PLS OXIMETRY MLT: CPT | Mod: HCNC

## 2019-01-08 PROCEDURE — 80053 COMPREHEN METABOLIC PANEL: CPT | Mod: HCNC

## 2019-01-08 PROCEDURE — 25000003 PHARM REV CODE 250: Mod: HCNC | Performed by: STUDENT IN AN ORGANIZED HEALTH CARE EDUCATION/TRAINING PROGRAM

## 2019-01-08 PROCEDURE — 63600175 PHARM REV CODE 636 W HCPCS: Mod: HCNC | Performed by: STUDENT IN AN ORGANIZED HEALTH CARE EDUCATION/TRAINING PROGRAM

## 2019-01-08 PROCEDURE — 87040 BLOOD CULTURE FOR BACTERIA: CPT | Mod: 59,HCNC

## 2019-01-08 PROCEDURE — 11000001 HC ACUTE MED/SURG PRIVATE ROOM: Mod: HCNC

## 2019-01-08 PROCEDURE — 97161 PT EVAL LOW COMPLEX 20 MIN: CPT | Mod: HCNC

## 2019-01-08 PROCEDURE — 63600175 PHARM REV CODE 636 W HCPCS: Mod: HCNC | Performed by: EMERGENCY MEDICINE

## 2019-01-08 PROCEDURE — 99232 PR SUBSEQUENT HOSPITAL CARE,LEVL II: ICD-10-PCS | Mod: HCNC,GC,, | Performed by: INTERNAL MEDICINE

## 2019-01-08 PROCEDURE — 84100 ASSAY OF PHOSPHORUS: CPT | Mod: HCNC

## 2019-01-08 PROCEDURE — 83735 ASSAY OF MAGNESIUM: CPT | Mod: HCNC

## 2019-01-08 PROCEDURE — 99232 SBSQ HOSP IP/OBS MODERATE 35: CPT | Mod: HCNC,GC,, | Performed by: INTERNAL MEDICINE

## 2019-01-08 PROCEDURE — 27000221 HC OXYGEN, UP TO 24 HOURS: Mod: HCNC

## 2019-01-08 PROCEDURE — 25000242 PHARM REV CODE 250 ALT 637 W/ HCPCS: Mod: HCNC | Performed by: STUDENT IN AN ORGANIZED HEALTH CARE EDUCATION/TRAINING PROGRAM

## 2019-01-08 RX ORDER — PREDNISONE 20 MG/1
40 TABLET ORAL DAILY
Status: DISCONTINUED | OUTPATIENT
Start: 2019-01-08 | End: 2019-01-09 | Stop reason: HOSPADM

## 2019-01-08 RX ADMIN — PANTOPRAZOLE SODIUM 40 MG: 40 TABLET, DELAYED RELEASE ORAL at 08:01

## 2019-01-08 RX ADMIN — GABAPENTIN 600 MG: 100 CAPSULE ORAL at 02:01

## 2019-01-08 RX ADMIN — GABAPENTIN 600 MG: 100 CAPSULE ORAL at 08:01

## 2019-01-08 RX ADMIN — IPRATROPIUM BROMIDE AND ALBUTEROL SULFATE 3 ML: .5; 3 SOLUTION RESPIRATORY (INHALATION) at 04:01

## 2019-01-08 RX ADMIN — LEVOTHYROXINE SODIUM 75 MCG: 75 TABLET ORAL at 05:01

## 2019-01-08 RX ADMIN — FERROUS SULFATE TAB EC 325 MG (65 MG FE EQUIVALENT) 325 MG: 325 (65 FE) TABLET DELAYED RESPONSE at 08:01

## 2019-01-08 RX ADMIN — FUROSEMIDE 40 MG: 10 INJECTION, SOLUTION INTRAVENOUS at 08:01

## 2019-01-08 RX ADMIN — IPRATROPIUM BROMIDE AND ALBUTEROL SULFATE 3 ML: .5; 3 SOLUTION RESPIRATORY (INHALATION) at 03:01

## 2019-01-08 RX ADMIN — IPRATROPIUM BROMIDE AND ALBUTEROL SULFATE 3 ML: .5; 3 SOLUTION RESPIRATORY (INHALATION) at 08:01

## 2019-01-08 RX ADMIN — LEVOFLOXACIN 750 MG: 750 INJECTION, SOLUTION INTRAVENOUS at 08:01

## 2019-01-08 RX ADMIN — PREDNISONE 40 MG: 20 TABLET ORAL at 02:01

## 2019-01-08 RX ADMIN — IPRATROPIUM BROMIDE AND ALBUTEROL SULFATE 3 ML: .5; 3 SOLUTION RESPIRATORY (INHALATION) at 12:01

## 2019-01-08 RX ADMIN — PSYLLIUM HUSK 1 PACKET: 3.4 POWDER ORAL at 08:01

## 2019-01-08 RX ADMIN — ATORVASTATIN CALCIUM 80 MG: 20 TABLET, FILM COATED ORAL at 08:01

## 2019-01-08 RX ADMIN — ENOXAPARIN SODIUM 40 MG: 100 INJECTION SUBCUTANEOUS at 04:01

## 2019-01-08 NOTE — PLAN OF CARE
Problem: Adult Inpatient Plan of Care  Goal: Plan of Care Review  Patient still confused.  Updated family on POC

## 2019-01-08 NOTE — PLAN OF CARE
01/08/19 1515   Post-Acute Status   Post-Acute Authorization Placement   Post-Acute Placement Status (Pt is a resident at [facility])     SW spoke with Tobias from [facility] ([phone]), pt is a resident there and is able to return once ready for d/c. However, if pt were to need SNF services at d/c, [facility] is not in network with [facility], so pt would have to go to another SNF in network with [facility] and could return to [facility] when SNF is completed. BREANNE informed Dr. Rubio and OLGA.    Ameena Uriostegui, [facility] [phone]

## 2019-01-08 NOTE — SUBJECTIVE & OBJECTIVE
"Interval History: No acute events overnight, Pt afebrile so far this admission. Continues to endorse shortness of breath, but feels that it is more difficulty "getting air out than in"    Review of Systems   Constitutional: Negative for activity change, appetite change, chills, diaphoresis, fever and unexpected weight change.   HENT: Negative for congestion.    Eyes: Negative for visual disturbance.   Respiratory: Positive for cough (Chronic), shortness of breath (Exertional) and wheezing. Negative for choking and chest tightness.    Cardiovascular: Negative for chest pain, palpitations and leg swelling.   Gastrointestinal: Negative for abdominal distention, abdominal pain, anal bleeding, blood in stool, constipation, diarrhea, nausea, rectal pain and vomiting.   Musculoskeletal: Negative for arthralgias, back pain, joint swelling, neck pain and neck stiffness.   Skin: Negative for wound.   Neurological: Negative for headaches.   Psychiatric/Behavioral: Negative for sleep disturbance.     Objective:     Vital Signs (Most Recent):  Temp: 98.6 °F (37 °C) (01/08/19 1607)  Pulse: 86 (01/08/19 1607)  Resp: 19 (01/08/19 1607)  BP: (!) 117/56 (01/08/19 1607)  SpO2: (!) 93 % (01/08/19 1607) Vital Signs (24h Range):  Temp:  [97.2 °F (36.2 °C)-98.6 °F (37 °C)] 98.6 °F (37 °C)  Pulse:  [70-94] 86  Resp:  [15-22] 19  SpO2:  [93 %-100 %] 93 %  BP: (117-183)/(56-70) 117/56     Weight: 89.1 kg (196 lb 8 oz)  Body mass index is 35.94 kg/m².    Intake/Output Summary (Last 24 hours) at 1/8/2019 1752  Last data filed at 1/8/2019 1700  Gross per 24 hour   Intake 1240 ml   Output 1680 ml   Net -440 ml      Physical Exam   Constitutional: She is oriented to person, place, and time. She appears well-developed. No distress.   HENT:   Head: Normocephalic and atraumatic.   Right Ear: External ear normal.   Left Ear: External ear normal.   Nose: Nose normal.   Eyes: EOM are normal. Pupils are equal, round, and reactive to light.   Neck: No " JVD present. No tracheal deviation present.   Cardiovascular: Normal rate and regular rhythm.   No murmur heard.  Pulmonary/Chest: Effort normal. No stridor. No respiratory distress. She has wheezes (Coarse, diffuse). She has no rales. She exhibits no tenderness.   Abdominal: Soft. Bowel sounds are normal. She exhibits no distension. There is no tenderness. No hernia.   Musculoskeletal: She exhibits no edema.   Neurological: She is alert and oriented to person, place, and time. No cranial nerve deficit.   Skin: Capillary refill takes less than 2 seconds. No rash noted. She is not diaphoretic.   Psychiatric: She has a normal mood and affect. Judgment normal.   Vitals reviewed.      Significant Labs: All pertinent labs within the past 24 hours have been reviewed.    Significant Imaging: I have reviewed all pertinent imaging results/findings within the past 24 hours.

## 2019-01-08 NOTE — PLAN OF CARE
Problem: Physical Therapy Goal  Goal: Physical Therapy Goal  Pt does not require additional acute PT services at this time d/t baseline c functional mobility.     Pt educated on asking medical staff for PT consult if changes in functional status occurs. - v/u        Outcome: Outcome(s) achieved Date Met: 01/08/19  John and D/C from acute PT services    Brian Samayoa DPT  1/8/2019

## 2019-01-08 NOTE — PLAN OF CARE
Problem: Adult Inpatient Plan of Care  Goal: Plan of Care Review  Outcome: Ongoing (interventions implemented as appropriate)  Neuro: Pt. is A&Ox2 with periods of confusion/hallucination.   Skin: No skin breakdown noted.   Fall : safety and precuation maintained. Pt. worked with Physical Therapist today.   Safety and pt. care rounds maintained. Wctm.

## 2019-01-08 NOTE — PLAN OF CARE
Problem: Adult Inpatient Plan of Care  Goal: Plan of Care Review  Outcome: Ongoing (interventions implemented as appropriate)  Pt free of any injury or falls, VSS, skin intact. Scheduled and PRN meds given as ordered or when requested. Demonstrated ability to use call light when needed. Bed locked in lowest position. Care plan reviewed w/ patient and education given. Pt is not demonstrating any overt S/S of pain or distress at this time. Will continue to monitor.

## 2019-01-08 NOTE — HOSPITAL COURSE
01/08/2019 TTE showing Grade 1 diastolic dysfunction, EF 60%. Pt started on 40mg prednisone po for pneumonitis  01/09/2019 Patient weaned to room air. VS stable, medically ready for discharge.

## 2019-01-08 NOTE — PT/OT/SLP EVAL
"Physical Therapy Evaluation and Discharge Note    Patient Name:  Mary Waters   MRN:  [id number]    Recommendations:     Discharge Recommendations:  nursing facility, basic   Discharge Equipment Recommendations: none   Barriers to discharge: None    Assessment:     Mary Waters is a 87 y.o. female admitted with a medical diagnosis of Bronchitis. .  At this time, patient is functioning at their prior level of function and does not require further acute PT services.     Recent Surgery: * No surgery found *      Plan:     During this hospitalization, patient does not require further acute PT services.  Please re-consult if situation changes.      Subjective     Chief Complaint: weakness; B knee pain  Patient/Family Comments/goals: "I haven't walked in over a year."  Pain/Comfort:  · Pain Rating 1: (reports pain in B knee but did not rate)  · Location - Side 1: Bilateral  · Location - Orientation 1: generalized  · Location 1: knee  · Pain Addressed 1: Reposition, Distraction    Patients cultural, spiritual, Hoahaoism conflicts given the current situation:      Living Environment:  Pt lives in NH. Reports mainly WC bound and has not ambulated in over 1 year.  Prior to admission, patients level of function was assistance c all mobility - alisa lifted for all transfers, using WC for mobility.  Equipment used at home: wheelchair, lift device, hospital bed.  DME owned (not currently used): none.  Upon discharge, patient will have assistance from NH staff.    Objective:     Communicated with RN prior to session.  Patient found HOB elevated upon PT entry to room found with: peripheral IV, PureWick, oxygen     General Precautions: Standard, fall   Orthopedic Precautions:N/A   Braces: N/A     Exams:  · Cognitive Exam:  Patient is oriented to Person, Place, Time and Situation  · Gross Motor Coordination:  WFL  · Sensation:    · -       Intact  · RLE ROM: WFL except knee flexion - limited d/t pain c PROM and AROM  · RLE " Strength: grossly 3+/5  · LLE ROM: WFL except knee flexion - limited d/t pain c PROM and AROM  · LLE Strength: grossly 3+/5    Functional Mobility:  · Bed Mobility:     · Rolling Left:  maximal assistance  · Scooting: maximal assistance  · Supine to Sit: total assistance  · Balance: sitting EOB (max A)    AM-PAC 6 CLICK MOBILITY  Total Score:8       Therapeutic Activities and Exercises:  Pt educated on: PT role/POC; safety c mobility; benefits of OOB activities; performing therex; d/c recs - v/u  -Sat EOB x3min - limited d/t c/o B knee, R shoulder and back pain    AM-PAC 6 CLICK MOBILITY  Total Score:8     Patient left HOB elevated with all lines intact, call button in reach and RN notified.    GOALS:   Multidisciplinary Problems     Physical Therapy Goals     Not on file          Multidisciplinary Problems (Resolved)        Problem: Physical Therapy Goal    Goal Priority Disciplines Outcome Goal Variances Interventions   Physical Therapy Goal   (Resolved)     PT, PT/OT Outcome(s) achieved     Description:  Pt does not require additional acute PT services at this time d/t baseline c functional mobility.     Pt educated on asking medical staff for PT consult if changes in functional status occurs. - v/u                          History:     Past Medical History:   Diagnosis Date    Acute ischemic stroke 10/5/2015    s/p tPA     Anxiety 11/18/2013    Chronic constipation 3/20/2013    Cystocele 3/20/2013    Deep vein thrombosis     DJD (degenerative joint disease) of knee     GERD (gastroesophageal reflux disease)     Hypothyroid     Lumbar spinal stenosis 2/24/2015    Rectocele 3/20/2013    UTI (urinary tract infection) 10/2015    Enterococcus    Vaginal vault prolapse, posthysterectomy 3/20/2013       Past Surgical History:   Procedure Laterality Date    BLADDER SUSPENSION  1962    BREAST BIOPSY      BREAST SURGERY      biopsy    COLPOCLEISIS N/A 3/21/2014    [facility]  OR    CYSTOSCOPY N/A 3/21/2014    [facility] OR    CYSTOURETHROSCOPY  3/21/2014    HIP SURGERY      HYSTERECTOMY      TVH/ovaries remain (prolapse)    JOINT REPLACEMENT  2006    right hip  x4    PERINEORRHAPHY  3/21/2014    SUPRAPUBIC SLING (SPARC/BIOARC PROCEDURE) N/A 3/21/2014    [facility] OR    THYROIDECTOMY, PARTIAL      TOTLA COLPOCLEISIS WITH COLPECTOMY  3/21/2014    TRANSOBTURATOR TENSION-FREE MIDURETHRAL SLING  3/21/2014       Clinical Decision Making:     History  Co-morbidities and personal factors that may impact the plan of care Examination  Body Structures and Functions, activity limitations and participation restrictions that may impact the plan of care Clinical Presentation   Decision Making/ Complexity Score   Co-morbidities:   [] Time since onset of injury / illness / exacerbation  [] Status of current condition  []Patient's cognitive status and safety concerns    [] Multiple Medical Problems (see med hx)  Personal Factors:   [] Patient's age  [] Prior Level of function   [] Patient's home situation (environment and family support)  [] Patient's level of motivation  [] Expected progression of patient      HISTORY:(criteria)    [x] 01990 - no personal factors/history    [] 57875 - has 1-2 personal factor/comorbidity     [] 44562 - has >3 personal factor/comorbidity     Body Regions:  [] Objective examination findings  [] Head     []  Neck  [] Trunk   [] Upper Extremity  [] Lower Extremity    Body Systems:  [] For communication ability, affect, cognition, language, and learning style: the assessment of the ability to make needs known, consciousness, orientation (person, place, and time), expected emotional /behavioral responses, and learning preferences (eg, learning barriers, education  needs)  [] For the neuromuscular system: a general assessment of gross coordinated movement (eg, balance, gait, locomotion, transfers, and  transitions) and motor function  (motor control and motor learning)  [] For the musculoskeletal system: the assessment of gross symmetry, gross range of motion, gross strength, height, and weight  [] For the integumentary system: the assessment of pliability(texture), presence of scar formation, skin color, and skin integrity  [] For cardiovascular/pulmonary system: the assessment of heart rate, respiratory rate, blood pressure, and edema     Activity limitations:    [] Patient's cognitive status and saf ety concerns          [] Status of current condition      [] Weight bearing restriction  [] Cardiopulmunary Restriction    Participation Restrictions:   [] Goals and goal agreement with the patient     [] Rehab potential (prognosis) and probable outcome      Examination of Body System: (criteria)    [x] 16950 - addressing 1-2 elements    [] 91503 - addressing a total of 3 or more elements     [] 76750 -  Addressing a total of 4 or more elements         Clinical Presentation: (criteria)  Stable - 64206     On examination of body system using standardized tests and measures patient presents with 1-2 elements from any of the following: body structures and functions, activity limitations, and/or participation restrictions.  Leading to a clinical presentation that is considered stable and/or uncomplicated                              Clinical Decision Making  (Eval Complexity):  Low- 03382     Time Tracking:     PT Received On: 01/08/19  PT Start Time: 1121     PT Stop Time: 1130  PT Total Time (min): 9 min     Billable Minutes: Evaluation 9 min      Brian Samayoa, PT  01/08/2019

## 2019-01-08 NOTE — PLAN OF CARE
Omar Arthur MD   [address] [facility] [address]     Payor: [employer] MANAGED MEDICARE / Plan: [employer] MEDICARE HMO / Product Type: Capitation /         01/08/19 1203   Discharge Assessment   Assessment Type Discharge Planning Assessment   Confirmed/corrected address and phone number on facesheet? Yes   Assessment information obtained from? Patient   Expected Length of Stay (days) 3   Communicated expected length of stay with patient/caregiver yes   Prior to hospitilization cognitive status: Alert/Oriented   Prior to hospitalization functional status: Assistive Equipment   Current cognitive status: Alert/Oriented   Current Functional Status: Assistive Equipment   Facility Arrived From: [facility]   Lives With facility resident   Able to Return to Prior Arrangements yes   Is patient able to care for self after discharge? No   Who are your caregiver(s) and their phone number(s)? Lauren Waters (daughter) [phone]   Patient's perception of discharge disposition skilled nursing care facility   Readmission Within the Last 30 Days no previous admission in last 30 days   Patient currently being followed by outpatient case management? No   Patient currently receives any other outside agency services? No   Equipment Currently Used at Home wheelchair;hospital bed   Do you have any problems affording any of your prescribed medications? No   Is the patient taking medications as prescribed? yes   Does the patient have transportation home? Yes   Does the patient receive services at the [facility]? No   Discharge Plan A Return to nursing home  ([facility])   Patient/Family in Agreement with Plan yes

## 2019-01-09 VITALS
RESPIRATION RATE: 16 BRPM | HEART RATE: 91 BPM | DIASTOLIC BLOOD PRESSURE: 51 MMHG | WEIGHT: 196.5 LBS | HEIGHT: 62 IN | TEMPERATURE: 98 F | SYSTOLIC BLOOD PRESSURE: 109 MMHG | OXYGEN SATURATION: 99 % | BODY MASS INDEX: 36.16 KG/M2

## 2019-01-09 DIAGNOSIS — J98.4 PNEUMONITIS: Primary | ICD-10-CM

## 2019-01-09 PROBLEM — R06.89 RESPIRATORY INSUFFICIENCY: Status: RESOLVED | Noted: 2019-01-06 | Resolved: 2019-01-09

## 2019-01-09 LAB
ALBUMIN SERPL BCP-MCNC: 3.3 G/DL
ALP SERPL-CCNC: 84 U/L
ALT SERPL W/O P-5'-P-CCNC: 14 U/L
ANION GAP SERPL CALC-SCNC: 11 MMOL/L
AST SERPL-CCNC: 29 U/L
BACTERIA UR CULT: NO GROWTH
BASOPHILS # BLD AUTO: 0 K/UL
BASOPHILS NFR BLD: 0 %
BILIRUB SERPL-MCNC: 0.4 MG/DL
BUN SERPL-MCNC: 12 MG/DL
CALCIUM SERPL-MCNC: 9.1 MG/DL
CHLORIDE SERPL-SCNC: 99 MMOL/L
CO2 SERPL-SCNC: 26 MMOL/L
CREAT SERPL-MCNC: 0.8 MG/DL
DIFFERENTIAL METHOD: ABNORMAL
EOSINOPHIL # BLD AUTO: 0 K/UL
EOSINOPHIL NFR BLD: 0 %
ERYTHROCYTE [DISTWIDTH] IN BLOOD BY AUTOMATED COUNT: 20.4 %
EST. GFR  (AFRICAN AMERICAN): >60 ML/MIN/1.73 M^2
EST. GFR  (NON AFRICAN AMERICAN): >60 ML/MIN/1.73 M^2
GLUCOSE SERPL-MCNC: 158 MG/DL
HCT VFR BLD AUTO: 30.8 %
HGB BLD-MCNC: 8.5 G/DL
IMM GRANULOCYTES # BLD AUTO: 0.05 K/UL
IMM GRANULOCYTES NFR BLD AUTO: 0.8 %
LYMPHOCYTES # BLD AUTO: 0.8 K/UL
LYMPHOCYTES NFR BLD: 12.6 %
MAGNESIUM SERPL-MCNC: 1.9 MG/DL
MCH RBC QN AUTO: 21.9 PG
MCHC RBC AUTO-ENTMCNC: 27.6 G/DL
MCV RBC AUTO: 79 FL
MONOCYTES # BLD AUTO: 0.3 K/UL
MONOCYTES NFR BLD: 5.1 %
NEUTROPHILS # BLD AUTO: 5 K/UL
NEUTROPHILS NFR BLD: 81.5 %
NRBC BLD-RTO: 0 /100 WBC
PHOSPHATE SERPL-MCNC: 3.3 MG/DL
PLATELET # BLD AUTO: 336 K/UL
PMV BLD AUTO: 10.7 FL
POTASSIUM SERPL-SCNC: 3.4 MMOL/L
PROT SERPL-MCNC: 7.4 G/DL
RBC # BLD AUTO: 3.88 M/UL
SODIUM SERPL-SCNC: 136 MMOL/L
WBC # BLD AUTO: 6.11 K/UL

## 2019-01-09 PROCEDURE — 99900035 HC TECH TIME PER 15 MIN (STAT): Mod: HCNC

## 2019-01-09 PROCEDURE — 25000242 PHARM REV CODE 250 ALT 637 W/ HCPCS: Mod: HCNC | Performed by: STUDENT IN AN ORGANIZED HEALTH CARE EDUCATION/TRAINING PROGRAM

## 2019-01-09 PROCEDURE — 63600175 PHARM REV CODE 636 W HCPCS: Mod: HCNC | Performed by: STUDENT IN AN ORGANIZED HEALTH CARE EDUCATION/TRAINING PROGRAM

## 2019-01-09 PROCEDURE — 99238 HOSP IP/OBS DSCHRG MGMT 30/<: CPT | Mod: HCNC,GC,, | Performed by: INTERNAL MEDICINE

## 2019-01-09 PROCEDURE — 85025 COMPLETE CBC W/AUTO DIFF WBC: CPT | Mod: HCNC

## 2019-01-09 PROCEDURE — 80053 COMPREHEN METABOLIC PANEL: CPT | Mod: HCNC

## 2019-01-09 PROCEDURE — 25000003 PHARM REV CODE 250: Mod: HCNC | Performed by: STUDENT IN AN ORGANIZED HEALTH CARE EDUCATION/TRAINING PROGRAM

## 2019-01-09 PROCEDURE — 94761 N-INVAS EAR/PLS OXIMETRY MLT: CPT | Mod: HCNC

## 2019-01-09 PROCEDURE — 99238 PR HOSPITAL DISCHARGE DAY,<30 MIN: ICD-10-PCS | Mod: HCNC,GC,, | Performed by: INTERNAL MEDICINE

## 2019-01-09 PROCEDURE — 84100 ASSAY OF PHOSPHORUS: CPT | Mod: HCNC

## 2019-01-09 PROCEDURE — 94640 AIRWAY INHALATION TREATMENT: CPT | Mod: HCNC

## 2019-01-09 PROCEDURE — 36415 COLL VENOUS BLD VENIPUNCTURE: CPT | Mod: HCNC

## 2019-01-09 PROCEDURE — 83735 ASSAY OF MAGNESIUM: CPT | Mod: HCNC

## 2019-01-09 PROCEDURE — 63600175 PHARM REV CODE 636 W HCPCS: Mod: HCNC | Performed by: INTERNAL MEDICINE

## 2019-01-09 RX ORDER — FUROSEMIDE 40 MG/1
40 TABLET ORAL DAILY
Start: 2019-01-09 | End: 2020-01-09

## 2019-01-09 RX ORDER — ACETAMINOPHEN 325 MG/1
650 TABLET ORAL 3 TIMES DAILY PRN
Refills: 0 | COMMUNITY
Start: 2019-01-09

## 2019-01-09 RX ORDER — LEVOFLOXACIN 750 MG/1
750 TABLET ORAL DAILY
Start: 2019-01-09 | End: 2019-01-14 | Stop reason: SDUPTHER

## 2019-01-09 RX ORDER — LEVOFLOXACIN 750 MG/1
750 TABLET ORAL ONCE
Qty: 1 TABLET | Refills: 0
Start: 2019-01-09 | End: 2019-01-09 | Stop reason: HOSPADM

## 2019-01-09 RX ORDER — IPRATROPIUM BROMIDE AND ALBUTEROL SULFATE 2.5; .5 MG/3ML; MG/3ML
3 SOLUTION RESPIRATORY (INHALATION)
Status: DISCONTINUED | OUTPATIENT
Start: 2019-01-09 | End: 2019-01-09 | Stop reason: HOSPADM

## 2019-01-09 RX ORDER — PREDNISONE 20 MG/1
40 TABLET ORAL DAILY
Refills: 0
Start: 2019-01-10

## 2019-01-09 RX ORDER — LEVOFLOXACIN 750 MG/1
750 TABLET ORAL ONCE
Status: COMPLETED | OUTPATIENT
Start: 2019-01-09 | End: 2019-01-09

## 2019-01-09 RX ORDER — TRAMADOL HYDROCHLORIDE 50 MG/1
50 TABLET ORAL EVERY 8 HOURS PRN
Qty: 21 TABLET | Refills: 0 | Status: SHIPPED | OUTPATIENT
Start: 2019-01-09

## 2019-01-09 RX ORDER — ASCORBIC ACID 250 MG
250 TABLET ORAL DAILY
Start: 2019-01-09

## 2019-01-09 RX ORDER — LYSINE HCL 500 MG
1 TABLET ORAL 2 TIMES DAILY
Start: 2019-01-09

## 2019-01-09 RX ORDER — FERROUS SULFATE 325(65) MG
325 TABLET, DELAYED RELEASE (ENTERIC COATED) ORAL DAILY
Refills: 0
Start: 2019-01-10

## 2019-01-09 RX ORDER — POTASSIUM CHLORIDE 20 MEQ/1
40 TABLET, EXTENDED RELEASE ORAL
Status: COMPLETED | OUTPATIENT
Start: 2019-01-09 | End: 2019-01-09

## 2019-01-09 RX ADMIN — GABAPENTIN 600 MG: 100 CAPSULE ORAL at 08:01

## 2019-01-09 RX ADMIN — POTASSIUM CHLORIDE 40 MEQ: 1500 TABLET, EXTENDED RELEASE ORAL at 02:01

## 2019-01-09 RX ADMIN — LEVOFLOXACIN 750 MG: 750 TABLET, FILM COATED ORAL at 02:01

## 2019-01-09 RX ADMIN — FUROSEMIDE 40 MG: 10 INJECTION, SOLUTION INTRAVENOUS at 08:01

## 2019-01-09 RX ADMIN — IPRATROPIUM BROMIDE AND ALBUTEROL SULFATE 3 ML: .5; 3 SOLUTION RESPIRATORY (INHALATION) at 12:01

## 2019-01-09 RX ADMIN — LEVOTHYROXINE SODIUM 75 MCG: 75 TABLET ORAL at 06:01

## 2019-01-09 RX ADMIN — PANTOPRAZOLE SODIUM 40 MG: 40 TABLET, DELAYED RELEASE ORAL at 08:01

## 2019-01-09 RX ADMIN — GABAPENTIN 600 MG: 100 CAPSULE ORAL at 02:01

## 2019-01-09 RX ADMIN — PREDNISONE 40 MG: 20 TABLET ORAL at 08:01

## 2019-01-09 RX ADMIN — ATORVASTATIN CALCIUM 80 MG: 20 TABLET, FILM COATED ORAL at 08:01

## 2019-01-09 RX ADMIN — FERROUS SULFATE TAB EC 325 MG (65 MG FE EQUIVALENT) 325 MG: 325 (65 FE) TABLET DELAYED RESPONSE at 08:01

## 2019-01-09 RX ADMIN — IPRATROPIUM BROMIDE AND ALBUTEROL SULFATE 3 ML: .5; 3 SOLUTION RESPIRATORY (INHALATION) at 04:01

## 2019-01-09 RX ADMIN — PSYLLIUM HUSK 1 PACKET: 3.4 POWDER ORAL at 08:01

## 2019-01-09 RX ADMIN — POTASSIUM CHLORIDE 40 MEQ: 1500 TABLET, EXTENDED RELEASE ORAL at 11:01

## 2019-01-09 RX ADMIN — IPRATROPIUM BROMIDE AND ALBUTEROL SULFATE 3 ML: .5; 3 SOLUTION RESPIRATORY (INHALATION) at 11:01

## 2019-01-09 RX ADMIN — IPRATROPIUM BROMIDE AND ALBUTEROL SULFATE 3 ML: .5; 3 SOLUTION RESPIRATORY (INHALATION) at 09:01

## 2019-01-09 NOTE — ASSESSMENT & PLAN NOTE
Unclear etiology / significance. Noted on telemetry, confirmed on EKG. Historically pt is in sinus rhythm; denies recent or current episodes of chest pain / hx MI or arrhythmia hx.    Plan  - Keep on telemetry  - Monitor electrolytes, correct imbalances  - Echocardiogram showing Grade I (mild) left ventricular diastolic dysfunction consistent with impaired relaxation, preserved EF

## 2019-01-09 NOTE — ASSESSMENT & PLAN NOTE
"Per CT read "There are multifocal subsegmental densities within the posterior right upper lobe, lingula, and left lower lobe not definitely seen on prior exam;" additionally, multiple old nodules and micronodules are stable on CT. Pt is a never-smoker, procalcitonin is negative, but does have new wheezing.    Plan  - Abx coverage with levofloxacin. Pt has been afebrile, without leukocytosis, making infectious etiology less likely.  - Single positive Blood culture growing G+ cocci, likely a contaminant. Will repeat Bcx  - Duonebs PRN  - Resp Viral Panel negative  - 40mg Prednisone started for possible reactive airway disease  "

## 2019-01-09 NOTE — PLAN OF CARE
Problem: Adult Inpatient Plan of Care  Goal: Plan of Care Review  Outcome: Ongoing (interventions implemented as appropriate)  Pt remains free from and injury of falls during shift. Awake, alert, and oriented x2. Confused to place and time. Vital signs stable. No skin breakdown noted. No signs of acute distress noted at this time. Bed in lowest position, wheels locked, and bed alarm on. Call light in reach. Will continue to monitor.

## 2019-01-09 NOTE — ASSESSMENT & PLAN NOTE
Unclear etiology / significance. Noted on telemetry, confirmed on EKG. Historically pt is in sinus rhythm; denies recent or current episodes of chest pain / hx MI or arrhythmia hx.    Plan  - Echocardiogram showing Grade I (mild) left ventricular diastolic dysfunction consistent with impaired relaxation, preserved EF

## 2019-01-09 NOTE — ASSESSMENT & PLAN NOTE
Etiology unclear. Not on blood thinners, denies melena, denies NSAID use. STEPHANIE negative; will get FOBT as pt says she has not had c-scope before. Hb 6.7 on admit compared to 11.4 2 years ago (most recent value)    Microcytic (previous Hb normocytic) with Iron panel + TIBC + ferritin suggestive of iron deficiency anemia    Plan  - Transfused 1U PRBC, repeat CBC 8.5 from 6.7  - FOBT  - Continue home PPI  - Transfuse for Hb <7  - 325mg  Iron Sulfate daily

## 2019-01-09 NOTE — ASSESSMENT & PLAN NOTE
New onset; per pt has been happening for the last few days. Suspect symptomatic anemia vs pneumonia as cause. With new onset wheezing. Negative procalcitonin, no WBC elevation, no fever.    Plan  - Duonebs Q4H PRN wheezing  - Prednisone 40mg  - Correct anemia  - Resp viral panel PCR Negative  - BNP and TTE not consistent with CHF exacerbation

## 2019-01-09 NOTE — ASSESSMENT & PLAN NOTE
New onset; per pt has been happening for the last few days. Suspect symptomatic anemia vs pneumonia as cause. With new onset wheezing. Negative procalcitonin, no WBC elevation, no fever. Pt weaned to room air     Plan  - Duonebs Q4H PRN wheezing  - Prednisone 40mg  - Resp viral panel PCR Negative  - BNP and TTE not consistent with CHF exacerbation

## 2019-01-09 NOTE — PLAN OF CARE
[facility]     Department of Hospital Medicine     [address] [place] [address]     [phone] after hours  [phone] fax       NURSING HOME ORDERS    01/09/2019    Admit to Nursing Home:  Regular Bed     Diagnoses:  Active Hospital Problems    Diagnosis  POA    *Bronchitis [J40]  Yes     Priority: 5     Iron deficiency [E61.1]  Yes     Priority: 1 - High    Respiratory insufficiency [R06.89]  Yes     Priority: 2     Urge incontinence [N39.41]  Yes     Priority: 3     Debility [R53.81]  Yes     Priority: 4     Sinus arrhythmia [I49.8]  Yes     Priority: 6       Resolved Hospital Problems   No resolved problems to display.       Patient is homebound due to:  Bronchitis    Allergies:  Review of patient's allergies indicates:   Allergen Reactions    Tizanidine Other (See Comments)     Daughter report this medication incapable of functioning.    Cortisone      Other reaction(s): Flushing (skin)    Procaine      Other reaction(s): palpitations    Penicillins Rash       Vitals:      Once weekly    Diet: Adult Regular    Acitivities:   - Up in a chair each morning as tolerated   - Ambulate with assistance to bathroom    LABS:  Per facility protocol    Nursing Precautions:    - Fall precautions per nursing home protocol    - Decubitus precautions:        -  for positioning   - Pressure reducing foam mattress   - Turn patient every two hours. Use wedge pillows to anchor patient    CONSULTS:   Physical Therapy to evaluate and treat     Occupational Therapy to evaluate and treat      MISCELLANEOUS CARE  N/A      Medications: Discontinue all previous medication orders, if any. See new list below.   Mary Waters   Home Medication Instructions KATHERIN:[id number]    Printed on:01/09/19 1009   Medication Information                      acetaminophen (TYLENOL) 325 MG tablet  Take 2 tablets (650 mg total) by mouth 3 (three) times daily as needed for  Pain.             albuterol (PROVENTIL) 2.5 mg /3 mL (0.083 %) nebulizer solution  Take 2.5 mg by nebulization every 6 (six) hours as needed for Wheezing. Rescue             ARNICA TOP  Apply topically as needed             ascorbic acid, vitamin C, (VITAMIN C) 250 MG tablet  Take 1 tablet (250 mg total) by mouth once daily. Take with ferrous sulfate for better absorption.             atorvastatin (LIPITOR) 80 MG tablet  Take 1 tablet (80 mg total) by mouth once daily.             calcium carbonate-vit D3-min 600 mg calcium- 400 unit Tab  Take 1 tablet by mouth 2 (two) times daily. Separate levofloxacin at least 2 hours before or 2 hours after calcium or calcium containing products.             cephALEXin (KEFLEX) 250 MG capsule  TAKE 1 CAPSULE BY MOUTH ONCE DAILY             cetirizine (ZYRTEC) 10 MG tablet  Take 10 mg by mouth daily as needed for Allergies.             CRANBERRY FRUIT CONCENTRATE (AZO CRANBERRY ORAL)  Take 2 tablets by mouth once daily.              dextromethorphan-guaifenesin (ADULT ROBITUSSIN PEAK COLD DM)  mg/5 mL Liqd  Take 5 mLs by mouth every 6 (six) hours as needed (cough).             escitalopram oxalate (LEXAPRO) 20 MG tablet  Take 1 tablet (20 mg total) by mouth once daily.             ferrous sulfate 325 (65 FE) MG EC tablet  Take 1 tablet (325 mg total) by mouth once daily. Separate levofloxacin at least 2 hours before or 2 hours after iron.             furosemide (LASIX) 40 MG tablet  Take 1 tablet (40 mg total) by mouth once daily.             gabapentin (NEURONTIN) 600 MG tablet  TAKE 1 TABLET BY MOUTH 3 TIMES DAILY             lansoprazole (PREVACID) 30 MG capsule  TAKE 1 CAPSULE BY MOUTH ONCE DAILY             levoFLOXacin (LEVAQUIN) 750 MG tablet  Take 1 tablet (750 mg total) by mouth once daily. For 2 days starting 01/10/2019.             levothyroxine (SYNTHROID) 75 MCG tablet  TAKE 1 TABLET EVERY EVENING  AT  5PM             lidocaine (LIDODERM) 5 %  Place 1 patch onto  the skin every 24 hours. To affected area ( right shoulder )  Remove & Discard patch within 12 hours or as directed by MD richard-min-folic acid-lutein (CENTRUM SILVER) 400-250 mcg Chew  Take 1 tablet by mouth once daily. Separate levofloxacin at least 2 hours before or 2 hours after multivitamin.             predniSONE (DELTASONE) 20 MG tablet  Take 2 tablets (40 mg total) by mouth once daily. Take with Food. For 3 days starting 01/10/2019.             Saccharomyces boulardii (FLORASTOR) 250 mg capsule  Take 250 mg by mouth Daily. Give at least 2 hours after cephalexin             tramadol (ULTRAM) 50 mg tablet  Take 1 tablet (50 mg total) by mouth every 8 (eight) hours as needed for Pain (Moderate Pain).             trolamine salicylate (ASPERCREME) 10 % cream  Apply topically 2 (two) times daily as needed. Apply to knees PRN                       _________________________________  Karan Vasquez MD  01/09/2019

## 2019-01-09 NOTE — PROGRESS NOTES
"[facility] Hospital Medicine  Progress Note    Patient Name: Mary Waters  MRN: [id number]  Patient Class: IP- Inpatient   Admission Date: 1/6/2019  Length of Stay: 2 days  Attending Physician: Carisa Rubio MD  Primary Care Provider: Omar Arthur MD    [facility] Medicine Team: [facility] HOSP MED 4 Foster Moore DO    Subjective:     Principal Problem:Bronchitis    HPI:  87F with DJD, GERD, chronic UTI on suppressive cephalexin transferred from [facility] for 2 days of exertional dyspnea and wheezing. Pt says that she does not feel any dyspnea or chest pain at rest, but that she has been feeling more winded after minimal exertion (mostly bedbound at baseline), e.g. reaching across the bed for her tray, which is abnormal for her. She denies any fever, chills, body aches, fatigue, or hemoptysis, but says that she continues to have a cough, which she has had for several years.    Per NH report, pt was hypoxic to the mid-80s with O2 rising to low 90s with duonebs; in the ED pt's O2 sat was 92-98% on room air. CT was performed (NH CXR read recommended CT to further characterize consolidations); CT read as "multifocal subsegmental densities within the posterior right upper lobe, lingula, and left lower lobe not definitely seen on prior exam." Pt was afebrile, vitals were normal, WBC was 4.69; however, Hb was 6.7 (compared to 11.4 in 2016). STEPHANIE was negative, pt denies any melena, hematochezia, or taking blood thinners. Transfused 1U PRBC in the ED and admitted with symptomatic anemia as well as new-onset wheezing with pneumonitis vs PNA on CT.    Pt is a never-smoker, denies alcohol or illicit drug use.    Hospital Course:  01/08/2019 TTE showing Grade 1 diastolic dysfunction, EF 60%.    Interval History: No acute events overnight, Pt afebrile so far this admission. Continues to endorse shortness of breath, but feels that it is more difficulty "getting air out than in"    Review " of Systems   Constitutional: Negative for activity change, appetite change, chills, diaphoresis, fever and unexpected weight change.   HENT: Negative for congestion.    Eyes: Negative for visual disturbance.   Respiratory: Positive for cough (Chronic), shortness of breath (Exertional) and wheezing. Negative for choking and chest tightness.    Cardiovascular: Negative for chest pain, palpitations and leg swelling.   Gastrointestinal: Negative for abdominal distention, abdominal pain, anal bleeding, blood in stool, constipation, diarrhea, nausea, rectal pain and vomiting.   Musculoskeletal: Negative for arthralgias, back pain, joint swelling, neck pain and neck stiffness.   Skin: Negative for wound.   Neurological: Negative for headaches.   Psychiatric/Behavioral: Negative for sleep disturbance.     Objective:     Vital Signs (Most Recent):  Temp: 98.6 °F (37 °C) (01/08/19 1607)  Pulse: 86 (01/08/19 1607)  Resp: 19 (01/08/19 1607)  BP: (!) 117/56 (01/08/19 1607)  SpO2: (!) 93 % (01/08/19 1607) Vital Signs (24h Range):  Temp:  [97.2 °F (36.2 °C)-98.6 °F (37 °C)] 98.6 °F (37 °C)  Pulse:  [70-94] 86  Resp:  [15-22] 19  SpO2:  [93 %-100 %] 93 %  BP: (117-183)/(56-70) 117/56     Weight: 89.1 kg (196 lb 8 oz)  Body mass index is 35.94 kg/m².    Intake/Output Summary (Last 24 hours) at 1/8/2019 1752  Last data filed at 1/8/2019 1700  Gross per 24 hour   Intake 1240 ml   Output 1680 ml   Net -440 ml      Physical Exam   Constitutional: She is oriented to person, place, and time. She appears well-developed. No distress.   HENT:   Head: Normocephalic and atraumatic.   Right Ear: External ear normal.   Left Ear: External ear normal.   Nose: Nose normal.   Eyes: EOM are normal. Pupils are equal, round, and reactive to light.   Neck: No JVD present. No tracheal deviation present.   Cardiovascular: Normal rate and regular rhythm.   No murmur heard.  Pulmonary/Chest: Effort normal. No stridor. No respiratory distress. She has  "wheezes (Coarse, diffuse). She has no rales. She exhibits no tenderness.   Abdominal: Soft. Bowel sounds are normal. She exhibits no distension. There is no tenderness. No hernia.   Musculoskeletal: She exhibits no edema.   Neurological: She is alert and oriented to person, place, and time. No cranial nerve deficit.   Skin: Capillary refill takes less than 2 seconds. No rash noted. She is not diaphoretic.   Psychiatric: She has a normal mood and affect. Judgment normal.   Vitals reviewed.      Significant Labs: All pertinent labs within the past 24 hours have been reviewed.    Significant Imaging: I have reviewed all pertinent imaging results/findings within the past 24 hours.    Assessment/Plan:      * Bronchitis    Per CT read "There are multifocal subsegmental densities within the posterior right upper lobe, lingula, and left lower lobe not definitely seen on prior exam;" additionally, multiple old nodules and micronodules are stable on CT. Pt is a never-smoker, procalcitonin is negative, but does have new wheezing.    Plan  - Abx coverage with levofloxacin. Pt has been afebrile, without leukocytosis, making infectious etiology less likely.  - Single positive Blood culture growing G+ cocci, likely a contaminant. Will repeat Bcx  - Duonebs PRN  - Resp Viral Panel negative  - 40mg Prednisone started for possible reactive airway disease     Sinus arrhythmia    Unclear etiology / significance. Noted on telemetry, confirmed on EKG. Historically pt is in sinus rhythm; denies recent or current episodes of chest pain / hx MI or arrhythmia hx.    Plan  - Keep on telemetry  - Monitor electrolytes, correct imbalances  - Echocardiogram showing Grade I (mild) left ventricular diastolic dysfunction consistent with impaired relaxation, preserved EF     Respiratory insufficiency    New onset; per pt has been happening for the last few days. Suspect symptomatic anemia vs pneumonia as cause. With new onset wheezing. Negative " procalcitonin, no WBC elevation, no fever.    Plan  - Duonebs Q4H PRN wheezing  - Prednisone 40mg  - Correct anemia  - Resp viral panel PCR Negative  - BNP and TTE not consistent with CHF exacerbation     Iron deficiency    Etiology unclear. Not on blood thinners, denies melena, denies NSAID use. STEPHANIE negative; will get FOBT as pt says she has not had c-scope before. Hb 6.7 on admit compared to 11.4 2 years ago (most recent value)    Microcytic (previous Hb normocytic) with Iron panel + TIBC + ferritin suggestive of iron deficiency anemia    Plan  - Transfused 1U PRBC, repeat CBC 8.5 from 6.7  - FOBT  - Continue home PPI  - Transfuse for Hb <7  - 325mg  Iron Sulfate daily     Hypothyroidism due to acquired atrophy of thyroid    Home meds - Synthroid 75 mcg    Plan  - Continue home synthroid 75 mcg     Debility    Plan  - PT/OT consult     Urge incontinence    Home meds: Oxybutynin 10 mg daily    Plan  - Continue home meds       VTE Risk Mitigation (From admission, onward)        Ordered     enoxaparin injection 40 mg  Daily      01/07/19 1136     Place sequential compression device  Until discontinued      01/06/19 1949     Place DOUGLAS hose  Until discontinued      01/06/19 1949     IP VTE HIGH RISK PATIENT  Once      01/06/19 1949              Foster Moore DO  Department of Hospital Medicine   [facility]

## 2019-01-09 NOTE — PLAN OF CARE
01/09/19 1012   Post-Acute Status   Post-Acute Authorization Placement   Post-Acute Placement Status Referrals Sent     CM informed pola pt is ready for d/c today back to [facility]. POLA sent orders through [facility]. POLA informed Tobias at [facility] that pt is ready and orders have been sent.    Ameena Uriostegui, ALLISON [id number]

## 2019-01-09 NOTE — PLAN OF CARE
01/09/19 1435   Post-Acute Status   Post-Acute Authorization Placement   Post-Acute Placement Status Authorization Obtained     Pt is ready to transfer back to [facility]. Nurse to call report to [phone], ask for Yan Patel at [address] [place] [address]. BREANNE informed nurse.    Pt ambulance transport set up through [facility] for a 4pm .    BREANNE informed pts dtg Lauren of transfer. Lauren expressed concerns over not hearing from team about pts medical status. She would like team to call her at [phone]. BREANNE sent message to [facility] through secure chat.    Ameena Uriostegui, ALLISON [id number]

## 2019-01-09 NOTE — DISCHARGE SUMMARY
"[facility] Hospital Medicine  Discharge Summary      Patient Name: Mary Waters  MRN: [id number]  Admission Date: 1/6/2019  Hospital Length of Stay: 3 days  Discharge Date and Time:  01/09/2019 5:29 PM  Attending Physician: No att. providers found   Discharging Provider: Foster Moore DO  Primary Care Provider: Omar Arthur MD  [facility] Medicine Team: [facility] 4 Foster Moore DO    HPI:   87F with DJD, GERD, chronic UTI on suppressive cephalexin transferred from [facility] for 2 days of exertional dyspnea and wheezing. Pt says that she does not feel any dyspnea or chest pain at rest, but that she has been feeling more winded after minimal exertion (mostly bedbound at baseline), e.g. reaching across the bed for her tray, which is abnormal for her. She denies any fever, chills, body aches, fatigue, or hemoptysis, but says that she continues to have a cough, which she has had for several years.    Per NH report, pt was hypoxic to the mid-80s with O2 rising to low 90s with duonebs; in the ED pt's O2 sat was 92-98% on room air. CT was performed (NH CXR read recommended CT to further characterize consolidations); CT read as "multifocal subsegmental densities within the posterior right upper lobe, lingula, and left lower lobe not definitely seen on prior exam." Pt was afebrile, vitals were normal, WBC was 4.69; however, Hb was 6.7 (compared to 11.4 in 2016). STEPHANIE was negative, pt denies any melena, hematochezia, or taking blood thinners. Transfused 1U PRBC in the ED and admitted with symptomatic anemia as well as new-onset wheezing with pneumonitis vs PNA on CT.    Pt is a never-smoker, denies alcohol or illicit drug use.    * No surgery found *      Hospital Course:   01/08/2019 TTE showing Grade 1 diastolic dysfunction, EF 60%. Pt started on 40mg prednisone po for pneumonitis  01/09/2019 Patient weaned to room air. VS stable, medically ready for discharge.      BP " "(!) 109/51 (BP Location: Left arm, Patient Position: Lying)   Pulse 91   Temp 97.5 °F (36.4 °C) (Oral)   Resp 16   Ht 5' 2" (1.575 m)   Wt 89.1 kg (196 lb 8 oz)   LMP  (LMP Unknown)   SpO2 99%   Breastfeeding? No   BMI 35.94 kg/m²     Physical Exam   Constitutional: She is oriented to person, place, and time. She appears well-developed and well-nourished. No distress.   HENT:   Head: Normocephalic and atraumatic.   Mouth/Throat: Oropharynx is clear and moist.   Cardiovascular: Normal rate, normal heart sounds and intact distal pulses. Exam reveals no gallop and no friction rub.   No murmur heard.  Pulmonary/Chest: Effort normal. No stridor. No respiratory distress. She has wheezes. She has no rales. She exhibits no tenderness.   Abdominal: Soft. Bowel sounds are normal. She exhibits no distension and no mass. There is no tenderness. There is no rebound and no guarding. No hernia.   Musculoskeletal: She exhibits no tenderness.   Neurological: She is alert and oriented to person, place, and time.   Skin: Skin is warm and dry. No rash noted. She is not diaphoretic.   Psychiatric: She has a normal mood and affect. Her behavior is normal.     Consults:   Consults (From admission, onward)        Status Ordering Provider     IP consult to case management  Once     Provider:  (Not yet assigned)    Acknowledged BRIANNA VALDIVIA          * Bronchitis    Per CT read "There are multifocal subsegmental densities within the posterior right upper lobe, lingula, and left lower lobe not definitely seen on prior exam;" additionally, multiple old nodules and micronodules are stable on CT. Pt is a never-smoker, procalcitonin is negative, but does have new wheezing.    Plan  - Abx coverage with levofloxacin. Pt has been afebrile, without leukocytosis, making infectious etiology less likely.  - Single positive Blood culture growing G+ cocci, likely a contaminant. Will repeat Bcx  - Duonebs PRN  - Resp Viral Panel negative  - " 40mg Prednisone started for possible reactive airway disease     Sinus arrhythmia    Unclear etiology / significance. Noted on telemetry, confirmed on EKG. Historically pt is in sinus rhythm; denies recent or current episodes of chest pain / hx MI or arrhythmia hx.    Plan  - Echocardiogram showing Grade I (mild) left ventricular diastolic dysfunction consistent with impaired relaxation, preserved EF     Respiratory insufficiency-resolved as of 1/9/2019    New onset; per pt has been happening for the last few days. Suspect symptomatic anemia vs pneumonia as cause. With new onset wheezing. Negative procalcitonin, no WBC elevation, no fever. Pt weaned to room air     Plan  - Duonebs Q4H PRN wheezing  - Prednisone 40mg  - Resp viral panel PCR Negative  - BNP and TTE not consistent with CHF exacerbation       Final Active Diagnoses:    Diagnosis Date Noted POA    PRINCIPAL PROBLEM:  Bronchitis [J40] 01/06/2019 Yes    Sinus arrhythmia [I49.8] 01/07/2019 Yes    Iron deficiency [E61.1] 01/06/2019 Yes    Debility [R53.81] 10/15/2015 Yes    Urge incontinence [N39.41] 03/20/2013 Yes      Problems Resolved During this Admission:    Diagnosis Date Noted Date Resolved POA    Respiratory insufficiency [R06.89] 01/06/2019 01/09/2019 Yes       Discharged Condition: stable    Disposition: Skilled Nursing Facility    Follow Up:  Follow-up Information     Omar Arthur MD. Go in 1 week.    Specialty:  Internal Medicine  Why:  hospital follow-up  Contact information:  [address] [facility] [address]  [phone]                 Patient Instructions:      Notify your health care provider if you experience any of the following:  temperature >100.4     Notify your health care provider if you experience any of the following:  persistent nausea and vomiting or diarrhea     Notify your health care provider if you experience any of the following:  severe persistent headache     Notify your health care provider if you  experience any of the following:  difficulty breathing or increased cough     Prepare RBC 2 Units; low RBC   Standing Status: Future Standing Exp. Date: 02/06/20     Order Specific Question Answer Comments   Number of Units 2 Units    Reason to prepare multiple units (e.g. Emergency): low RBC    Purpose of Preparation: Pending Transfusion      Activity as tolerated       Significant Diagnostic Studies: Labs:   CMP   Recent Labs   Lab 01/08/19  0611 01/09/19  0722    136   K 3.5 3.4*   CL 99 99   CO2 30* 26   GLU 93 158*   BUN 9 12   CREATININE 0.8 0.8   CALCIUM 9.6 9.1   PROT 7.2 7.4   ALBUMIN 3.3* 3.3*   BILITOT 0.4 0.4   ALKPHOS 85 84   AST 30 29   ALT 13 14   ANIONGAP 9 11   ESTGFRAFRICA >60.0 >60.0   EGFRNONAA >60.0 >60.0   , CBC   Recent Labs   Lab 01/08/19  0611 01/09/19  0722   WBC 6.71 6.11   HGB 8.2* 8.5*   HCT 30.0* 30.8*    336     Blood Culture   Lab Results   Component Value Date    LABBLOO No growth to date 01/08/2019    Cardiac Graphics: Echocardiogram:   Transthoracic echo (TTE) complete (Cupid Only):   Results for orders placed or performed during the hospital encounter of 01/06/19   Transthoracic echo (TTE) complete (Cupid Only)   Result Value Ref Range    Ascending aorta 3.06 cm    STJ 2.17 cm    AV mean gradient 5.82 mmHg    Ao peak hua 1.90 m/s    Ao VTI 34.20 cm    IVS 1.05 0.6 - 1.1 cm    LA size 4.35 cm    Left Atrium Major Axis 5.00 cm    Left Atrium Minor Axis 4.80 cm    LVIDD 3.87 3.5 - 6.0 cm    LVIDS 2.75 2.1 - 4.0 cm    LVOT diameter 1.88 cm    LVOT peak VTI 24.27 cm    PW 1.08 0.6 - 1.1 cm    MV Peak A Hua 1.32 m/s    E wave decelartion time 251.10 msec    MV Peak E Hua 0.70 m/s    RA Major Axis 4.46 cm    RA Width 3.77 cm    RVDD 3.32 cm    Sinus 3.13 cm    TR Max Hua 2.96 m/s    TDI LATERAL 0.09     TDI SEPTAL 0.06     LA WIDTH 4.04 cm    LV Diastolic Volume 64.73 mL    LV Systolic Volume 28.30 mL    LV LATERAL E/E' RATIO 7.78     LV SEPTAL E/E' RATIO 11.67     FS 29 %     LA volume 73.17 cm3    LV mass 132.09 g    Left Ventricle Relative Wall Thickness 0.56 cm    AV valve area 1.97 cm2    AV index (prosthetic) 0.71     E/A ratio 0.53     Mean e' 0.08     LVOT area 2.77 cm2    LVOT stroke volume 67.34 cm3    AV peak gradient 14.44 mmHg    E/E' ratio 9.33     LV Systolic Volume Index 14.9 mL/m2    LV Diastolic Volume Index 34.12 mL/m2    LA Volume Index 38.6 mL/m2    LV Mass Index 69.6 g/m2    Triscuspid Valve Regurgitation Peak Gradient 35.05 mmHg    BSA 1.94 m2    Right Atrial Pressure (from IVC) 3 mmHg    TV rest pulmonary artery pressure 38 mmHg       Pending Diagnostic Studies:     None         Medications:  Reconciled Home Medications:      Medication List      START taking these medications    ascorbic acid (vitamin C) 250 MG tablet  Commonly known as:  VITAMIN C  Take 1 tablet (250 mg total) by mouth once daily. Take with ferrous sulfate for better absorption.     ferrous sulfate 325 (65 FE) MG EC tablet  Take 1 tablet (325 mg total) by mouth once daily. Separate levofloxacin at least 2 hours before or 2 hours after iron.  Start taking on:  1/10/2019     furosemide 40 MG tablet  Commonly known as:  LASIX  Take 1 tablet (40 mg total) by mouth once daily.     levoFLOXacin 750 MG tablet  Commonly known as:  LEVAQUIN  Take 1 tablet (750 mg total) by mouth once daily. For 2 days starting 01/10/2019.     predniSONE 20 MG tablet  Commonly known as:  DELTASONE  Take 2 tablets (40 mg total) by mouth once daily. Take with Food. For 3 days starting 01/10/2019.  Start taking on:  1/10/2019        CHANGE how you take these medications    acetaminophen 325 MG tablet  Commonly known as:  TYLENOL  Take 2 tablets (650 mg total) by mouth 3 (three) times daily as needed for Pain.  What changed:    · when to take this  · reasons to take this     calcium carbonate-vit D3-min 600 mg calcium- 400 unit Tab  Take 1 tablet by mouth 2 (two) times daily. Separate levofloxacin at least 2 hours before or  2 hours after calcium or calcium containing products.  What changed:  additional instructions     mv-min-folic acid-lutein 400-250 mcg Chew  Commonly known as:  CENTRUM SILVER  Take 1 tablet by mouth once daily. Separate levofloxacin at least 2 hours before or 2 hours after multivitamin.  What changed:    · medication strength  · additional instructions     traMADol 50 mg tablet  Commonly known as:  ULTRAM  Take 1 tablet (50 mg total) by mouth every 8 (eight) hours as needed for Pain (moderate pain).  What changed:  See the new instructions.        CONTINUE taking these medications    ADULT ROBITUSSIN PEAK COLD DM  mg/5 mL Liqd  Generic drug:  dextromethorphan-guaifenesin  Take 5 mLs by mouth every 6 (six) hours as needed (cough).     albuterol 2.5 mg /3 mL (0.083 %) nebulizer solution  Commonly known as:  PROVENTIL  Take 2.5 mg by nebulization every 6 (six) hours as needed for Wheezing. Rescue     ARNICA TOP  Apply topically as needed     atorvastatin 80 MG tablet  Commonly known as:  LIPITOR  Take 1 tablet (80 mg total) by mouth once daily.     AZO CRANBERRY ORAL  Take 2 tablets by mouth once daily.     cephALEXin 250 MG capsule  Commonly known as:  KEFLEX  TAKE 1 CAPSULE BY MOUTH ONCE DAILY     cetirizine 10 MG tablet  Commonly known as:  ZYRTEC  Take 10 mg by mouth daily as needed for Allergies.     escitalopram oxalate 20 MG tablet  Commonly known as:  LEXAPRO  Take 1 tablet (20 mg total) by mouth once daily.     gabapentin 600 MG tablet  Commonly known as:  NEURONTIN  TAKE 1 TABLET BY MOUTH 3 TIMES DAILY     lansoprazole 30 MG capsule  Commonly known as:  PREVACID  TAKE 1 CAPSULE BY MOUTH ONCE DAILY     levothyroxine 75 MCG tablet  Commonly known as:  SYNTHROID  TAKE 1 TABLET EVERY EVENING  AT  5PM     lidocaine 5 %  Commonly known as:  LIDODERM  Place 1 patch onto the skin every 24 hours. To affected area ( right shoulder )  Remove & Discard patch within 12 hours or as directed by MD     Saccharomyces  boulardii 250 mg capsule  Commonly known as:  FLORASTOR  Take 250 mg by mouth Daily. Give at least 2 hours after cephalexin     trolamine salicylate 10 % cream  Commonly known as:  ASPERCREME  Apply topically 2 (two) times daily as needed. Apply to knees PRN            Indwelling Lines/Drains at time of discharge:   Lines/Drains/Airways     Drain            Female External Urinary Catheter 01/07/19 1600 2 days                Time spent on the discharge of patient: 40 minutes  Patient was seen and examined on the date of discharge and determined to be suitable for discharge.         Foster Moore DO  Department of Hospital Medicine  [facility]

## 2019-01-10 LAB
BLD PROD TYP BPU: NORMAL
BLD PROD TYP BPU: NORMAL
BLOOD UNIT EXPIRATION DATE: NORMAL
BLOOD UNIT EXPIRATION DATE: NORMAL
BLOOD UNIT TYPE CODE: 6200
BLOOD UNIT TYPE CODE: 6200
BLOOD UNIT TYPE: NORMAL
BLOOD UNIT TYPE: NORMAL
CODING SYSTEM: NORMAL
CODING SYSTEM: NORMAL
DISPENSE STATUS: NORMAL
DISPENSE STATUS: NORMAL
TRANS ERYTHROCYTES VOL PATIENT: NORMAL ML
TRANS ERYTHROCYTES VOL PATIENT: NORMAL ML

## 2019-01-10 NOTE — PHYSICIAN QUERY
PT Name: Mary Waters  MR #: [id number]     CDS/: Stephanie Alvarez RN             Contact information: [phone]  This form is a permanent document in the medical record.     Query Date: January 10, 2019    Physician Query - Neurological Condition Clarification    By submitting this query, we are merely seeking further clarification of documentation to reflect the severity of illness of your patient. Please utilize your independent clinical judgment when addressing the question(s) below.    The Medical record reflects the following:     Indicators   Supporting Clinical Findings Location in Medical Record   x AMS, Confusion,  LOC, etc.  Patient very confused and having visual hallucinations.  Spoke with son who stated that he talked to her on the phone and said that she is not normally this confused    Still confused    Neuro: Pt. is A&Ox2 with periods of confusion/hallucination    Confused to place and time   1/7 RN note          1/7 RN note    1/8 RN note    1/9 RN note   x Acute / Chronic Illness Bronchitis  Iron deficient anemia  Respiratory insufficiency  Debility  Started levoquin for possible pneumonia 1/9 DC Summary   x Radiology Findings Scattered subsegmental densities within the posterior right upper lobe, lingula, and left lower lobe, suspect infectious pneumonitis.  Additional pulmonary findings as above 1/6 CT Chest    Electrolyte Imbalance     x Medication Albuterol neb  Levofloxacin    1/9- 1/9 MAR   x Treatment           CT chest  Daily CBC, BMP  Pulse oximetry  Continuous oxygen         1/6 NSG orders    Other       Encephalopathy- is a general term for any diffuse disease of the brain that alters brain function or structure. Treatment of the cognitive dysfunction varies but is ultimately dependent on the treatment of the underlying condition.    Major Symptoms of Encephalopathy - Decreased level of consciousness, fluctuating alertness/concentration, confusion, agitation, lethargy,  somnolence, drowsiness, obtundation, stupor, or coma.         References: National Institutes of Healths (NIH) National [place] of Neurological Disorders and Strokes;  HCPro 2016; Advisory Board     Clinical Guidelines:   These guidelines will set system standards to assist providers in managing, documentation, and coding of encephalopathy. The intent of this document is to serve as a system guideline, not replace the providers clinical judgment:  Provider, please specify the diagnosis or diagnoses associated with above clinical findings.  [ X  ] Metabolic Encephalopathy - Due to electrolye imbalance, metabolic derangements, or infections processes, includes Septic Encephalopathy   [   ] Other Encephalopathy - Includes uremic encephalopathy   [   ] Unspecified Encephalopathy      [   ] Other Neurological Condition-  Includes Post-ictal altered mental status. (please specify condition): _________   [   ]  Clinically Undetermined     Please document in your progress notes daily for the duration of treatment until resolved, and include in your discharge summary.

## 2019-01-10 NOTE — PHYSICIAN QUERY
PT Name: Mary Waters  MR #: [id number]     Physician Query Form - Documentation Clarification      CDS/: Stephanie Alvarez RN              Contact information: [phone]    This form is a permanent document in the medical record.     Query Date: January 10, 2019    By submitting this query, we are merely seeking further clarification of documentation. Please utilize your independent clinical judgment when addressing the question(s) below.    The Medical record reflects the following:    Supporting Clinical Findings Location in Medical Record   Pt began having auditory and visual hallucinations throughout the night stating she heard and saw adults and a little girl having a party in her room.       Patient very confused and having visual hallucinations.   poke with son who stated that he talked to her on the phone and said that she is not normally this confused.       Neuro: Pt. is A&Ox2 with periods of confusion/hallucination.    1/7 RN note          1/7 RN note          1/8 RN note   Delirium precautions       1/7 NSG orders                                                                            Doctor, Please specify diagnosis or diagnoses associated with above clinical findings.    [ X  ] Visual and auditory hallucinations    [   ] Visual hallucinations    [   ] Auditory hallucinations    [   ] Other diagnosis:______________________________________                                                                                                        [  ] Clinically Undetermined

## 2019-01-10 NOTE — PLAN OF CARE
01/10/19 0846   Final Note   Assessment Type Final Discharge Note   Anticipated Discharge Disposition [facility]   Right Care Referral Info   Post Acute Recommendation SNF / Sub-Acute Rehab   Facility Name [facility]

## 2019-01-11 LAB
BACTERIA BLD CULT: NORMAL

## 2019-01-11 NOTE — PHYSICIAN QUERY
"PT Name: Mary Waters  MR #: [id number]     Physician Query Form - Diagnosis Clarification      CDS/: Stephanie Alvarez RN               Contact information: [phone]    This form is a permanent document in the medical record.     Query Date: January 11, 2019    By submitting this query, we are merely seeking further clarification of documentation.  Please utilize your independent clinical judgment when addressing the question(s) below.     The medical record contains the following:      Findings Supporting Clinical Information Location in Medical Record   Pneumonia Transferred from nursing home for exertional dyspnea and wheezing.  Chest x-ray noted pneumonitis.  Started  on levaquin for possible pneumonia.    Per NH report, pt was hypoxic to the mid-80s with O2 rising to low 90s with duonebs; in the ED pt's O2 sat was 92-98% on room air.   Pt was afebrile, vitals were normal, WBC was 4.69    Bronchitis     Per CT read "There are multifocal subsegmental densities within the posterior right upper lobe, lingula, and left lower lobe not definitely seen on prior exam;" additionally, multiple  old nodules and micronodules are stable on CT. Pt is a never-smoker, procalcitonin is negative, but does have new wheezing.       Plan :  - Abx coverage with levofloxacin. Pt has been afebrile, without leukocytosis, making infectious etiology less likely.   - Single positive Blood culture growing G+ cocci, likely a contaminant. Will repeat Bcx   - Duonebs PRN   - Resp Viral Panel negative   - 40mg Prednisone started for possible reactive airway disease         Respiratory insufficiency- new onset  Suspect symptomatic anemia vs pneumonia as cause.     With new onset wheezing. Negative procalcitonin, no WBC elevation,  no fever. Pt weaned to room     DC medications:  Levofloxacin 70mg po daily for 2 days       Levofloxacin IVPB  Levofloxacin 750mg PO     1/9 DC " summary                                                                                  1/7-1/9 MAR     Please clarify if the pneumonia  diagnosis has been:    [  ] Ruled In   [  ] Ruled In, Now Resolved   [ X ] Ruled Out   [  ] Other/Clarification of findings (please specify):   [  ] Clinically insignificant     [  ] Clinically undetermined     Please document in your progress notes daily for the duration of treatment, until resolved, and include in your discharge summary.

## 2019-01-13 LAB
BACTERIA BLD CULT: NORMAL

## 2019-01-14 ENCOUNTER — TELEPHONE (OUTPATIENT)
Dept: INTERNAL MEDICINE | Facility: CLINIC | Age: 84
End: 2019-01-14

## 2019-01-14 RX ORDER — LEVOFLOXACIN 750 MG/1
750 TABLET ORAL DAILY
Qty: 14 TABLET | Refills: 0
Start: 2019-01-14

## 2019-01-15 NOTE — TELEPHONE ENCOUNTER
[facility] [phone]  Second floor South    Per ID will start/continue Levaquin for 2 weeks. Hold Keflex during that time.

## 2019-01-16 NOTE — PT/OT/SLP DISCHARGE
Occupational Therapy Discharge Summary    Mary Waters  MRN: [id number]   Principal Problem: Bronchitis      Patient Discharged from acute Occupational Therapy on 1/16/19.  Please refer to prior OT note dated 1/7/19 for functional status.    Assessment:      Patient has not met goals.    Objective:     GOALS:   Multidisciplinary Problems     Occupational Therapy Goals     Not on file          Multidisciplinary Problems (Resolved)        Problem: Occupational Therapy Goal    Goal Priority Disciplines Outcome Interventions   Occupational Therapy Goal   (Resolved)     OT, PT/OT Outcome(s) achieved    Description:  Goals to be met by: 1/17     Patient will increase functional independence with ADLs by performing:    UE Dressing with Moderate Assistance. - not met  Grooming while supine with Set-up Assistance. - not met  Rolling to Right with Minimal Assistance. - not met  Rolling to left with Stand by assistance - not met  Upper extremity exercise program x15 reps per handout, with independence. - not met                       Reasons for Discontinuation of Therapy Services  Transfer to alternate level of care.      Plan:     Patient Discharged to: Skilled Nursing Facility    KAMLESH Burger  1/16/2019

## 2019-07-18 ENCOUNTER — PATIENT MESSAGE (OUTPATIENT)
Dept: INTERNAL MEDICINE | Facility: CLINIC | Age: 84
End: 2019-07-18

## 2019-07-19 ENCOUNTER — PATIENT MESSAGE (OUTPATIENT)
Dept: INTERNAL MEDICINE | Facility: CLINIC | Age: 84
End: 2019-07-19

## 2019-07-30 ENCOUNTER — LAB VISIT (OUTPATIENT)
Dept: LAB | Facility: HOSPITAL | Age: 84
End: 2019-07-30
Attending: INTERNAL MEDICINE
Payer: MEDICARE

## 2019-07-30 ENCOUNTER — OFFICE VISIT (OUTPATIENT)
Dept: INTERNAL MEDICINE | Facility: CLINIC | Age: 84
End: 2019-07-30
Payer: MEDICARE

## 2019-07-30 VITALS
SYSTOLIC BLOOD PRESSURE: 104 MMHG | HEART RATE: 90 BPM | BODY MASS INDEX: 37.13 KG/M2 | HEIGHT: 61 IN | DIASTOLIC BLOOD PRESSURE: 60 MMHG | OXYGEN SATURATION: 81 %

## 2019-07-30 DIAGNOSIS — R73.9 ELEVATED BLOOD SUGAR: ICD-10-CM

## 2019-07-30 DIAGNOSIS — Z00.00 ROUTINE PHYSICAL EXAMINATION: Primary | ICD-10-CM

## 2019-07-30 DIAGNOSIS — M48.061 DEGENERATIVE LUMBAR SPINAL STENOSIS: ICD-10-CM

## 2019-07-30 DIAGNOSIS — K21.9 GASTROESOPHAGEAL REFLUX DISEASE, ESOPHAGITIS PRESENCE NOT SPECIFIED: ICD-10-CM

## 2019-07-30 DIAGNOSIS — R53.81 DEBILITY: ICD-10-CM

## 2019-07-30 LAB
ANION GAP SERPL CALC-SCNC: 9 MMOL/L (ref 8–16)
BUN SERPL-MCNC: 9 MG/DL (ref 8–23)
CALCIUM SERPL-MCNC: 9.4 MG/DL (ref 8.7–10.5)
CHLORIDE SERPL-SCNC: 103 MMOL/L (ref 95–110)
CO2 SERPL-SCNC: 29 MMOL/L (ref 23–29)
CREAT SERPL-MCNC: 0.7 MG/DL (ref 0.5–1.4)
EST. GFR  (AFRICAN AMERICAN): >60 ML/MIN/1.73 M^2
EST. GFR  (NON AFRICAN AMERICAN): >60 ML/MIN/1.73 M^2
ESTIMATED AVG GLUCOSE: 120 MG/DL (ref 68–131)
GLUCOSE SERPL-MCNC: 117 MG/DL (ref 70–110)
HBA1C MFR BLD HPLC: 5.8 % (ref 4–5.6)
POTASSIUM SERPL-SCNC: 3.9 MMOL/L (ref 3.5–5.1)
SODIUM SERPL-SCNC: 141 MMOL/L (ref 136–145)

## 2019-07-30 PROCEDURE — 99999 PR PBB SHADOW E&M-EST. PATIENT-LVL III: ICD-10-PCS | Mod: PBBFAC,HCNC,, | Performed by: INTERNAL MEDICINE

## 2019-07-30 PROCEDURE — 99397 PER PM REEVAL EST PAT 65+ YR: CPT | Mod: HCNC,S$GLB,, | Performed by: INTERNAL MEDICINE

## 2019-07-30 PROCEDURE — 99999 PR PBB SHADOW E&M-EST. PATIENT-LVL III: CPT | Mod: PBBFAC,HCNC,, | Performed by: INTERNAL MEDICINE

## 2019-07-30 PROCEDURE — 36415 COLL VENOUS BLD VENIPUNCTURE: CPT | Mod: HCNC

## 2019-07-30 PROCEDURE — 99397 PR PREVENTIVE VISIT,EST,65 & OVER: ICD-10-PCS | Mod: HCNC,S$GLB,, | Performed by: INTERNAL MEDICINE

## 2019-07-30 PROCEDURE — 83036 HEMOGLOBIN GLYCOSYLATED A1C: CPT | Mod: HCNC

## 2019-07-30 PROCEDURE — 80048 BASIC METABOLIC PNL TOTAL CA: CPT | Mod: HCNC

## 2019-07-30 NOTE — PROGRESS NOTES
Subjective:       Patient ID: Mary Waters is a 88 y.o. female.    Chief Complaint: Annual Exam    HPI:  Patient comes in for annual exam.  She is attended by her daughter and a caretaker from the nursing home where she is now staying.  Daughter was concerned about some minor memory slips and we discussed that at length.  It certainly seems that the patient is active, may have some minor memory issues but was able to participate all conversations and discuss family members both present and some she has not seen in a while.  We discussed this at length and I do not think she has any memory issues that are not age appropriate.  She does have history of stroke, degenerative spine disease.  She is having some urinary incontinence and frequency.  She is also having some skin breakdown from staying wet but the nursing home staff and doctors are working on that.    Review of Systems   Constitutional: Negative for appetite change, chills and fever.   HENT: Negative for nosebleeds, sinus pain and sore throat.    Eyes: Negative for visual disturbance.   Respiratory: Negative for cough, shortness of breath and wheezing.    Cardiovascular: Negative for chest pain and leg swelling.   Gastrointestinal: Negative for abdominal pain, constipation and diarrhea.   Genitourinary: Positive for frequency. Negative for difficulty urinating and hematuria.   Musculoskeletal: Positive for arthralgias and gait problem. Negative for neck pain and neck stiffness.   Skin: Positive for rash (Minor skin breakdown for moisture). Negative for pallor.   Neurological: Negative for headaches.        Minor memory issues   Psychiatric/Behavioral: Negative for dysphoric mood and suicidal ideas. The patient is not nervous/anxious.        Objective:      Physical Exam   Constitutional: She is oriented to person, place, and time. She appears well-developed and well-nourished. No distress.   Obese female   HENT:   Head: Normocephalic and atraumatic.    Mouth/Throat: Oropharynx is clear and moist. No oropharyngeal exudate.   Eyes: Pupils are equal, round, and reactive to light. Conjunctivae are normal. No scleral icterus.   Neck: Normal range of motion. Neck supple. No thyromegaly present.   Cardiovascular: Normal rate and regular rhythm.   No murmur heard.  Pulmonary/Chest: Effort normal and breath sounds normal. She has no wheezes.   Abdominal: Soft. Bowel sounds are normal. She exhibits no distension. There is no tenderness.   Musculoskeletal: She exhibits no tenderness.   Lymphadenopathy:     She has no cervical adenopathy.   Neurological: She is alert and oriented to person, place, and time.   Skin:   Patient did not want to get undressed from the waist down for me to see her rash and skin breakdown.  Daughter and nursing home caretakers say this is addressed regularly   Psychiatric: She has a normal mood and affect.   Vitals reviewed.      Assessment:       1. Routine physical examination    2. Elevated blood sugar    3. Degenerative lumbar spinal stenosis    4. Gastroesophageal reflux disease, esophagitis presence not specified    5. Debility        Plan:       Mary was seen today for annual exam.    Diagnoses and all orders for this visit:    Routine physical examination    Elevated blood sugar  -     Basic metabolic panel; Future  -     Hemoglobin A1c; Future    Degenerative lumbar spinal stenosis    Gastroesophageal reflux disease, esophagitis presence not specified    Debility

## 2019-07-31 ENCOUNTER — TELEPHONE (OUTPATIENT)
Dept: INTERNAL MEDICINE | Facility: CLINIC | Age: 84
End: 2019-07-31

## 2019-07-31 NOTE — TELEPHONE ENCOUNTER
Labs ok. Spoke to daughter. Will keep meds the same for now. Consider decrease lasix in the future.

## 2019-08-14 ENCOUNTER — PES CALL (OUTPATIENT)
Dept: ADMINISTRATIVE | Facility: CLINIC | Age: 84
End: 2019-08-14

## 2019-08-23 ENCOUNTER — PATIENT MESSAGE (OUTPATIENT)
Dept: INTERNAL MEDICINE | Facility: CLINIC | Age: 84
End: 2019-08-23

## 2019-10-08 ENCOUNTER — PES CALL (OUTPATIENT)
Dept: ADMINISTRATIVE | Facility: CLINIC | Age: 84
End: 2019-10-08

## 2019-11-07 ENCOUNTER — NURSE TRIAGE (OUTPATIENT)
Dept: ADMINISTRATIVE | Facility: CLINIC | Age: 84
End: 2019-11-07

## 2019-11-08 NOTE — TELEPHONE ENCOUNTER
"[facility]   [facility] nurse  [address] [place] [address]  [phone]  Sarthak    Reason for Disposition   [1] Request for URGENT new prescription or refill of "essential" medication (i.e., likelihood of harm to patient if not taken) AND [2] triager unable to fill per unit policy    Protocols used: MEDICATION QUESTION CALL-A-AH    Mary's son stating nursing home states they left a note for rounding physician for a request for preparation H cream (patient has chronic hemorrhoids) but the order was not signed yet. Son is demanding on call provider for Dr. Arthur be reached to see about an order for preparation H. On call provider (Dr. Serrano) reached and order was given for preparation H. Advised April, the patient's nurse at the nursing home. Advised son this is something that generally must be prescribed during office hours and if he believes Mary needs an order that cannot wait on weekly rounding physician at NH, he should reach Dr. Arthur during office hours in the week.  "

## 2019-11-19 ENCOUNTER — PATIENT MESSAGE (OUTPATIENT)
Dept: INTERNAL MEDICINE | Facility: CLINIC | Age: 84
End: 2019-11-19

## 2020-09-07 ENCOUNTER — LAB VISIT (OUTPATIENT)
Dept: LAB | Facility: OTHER | Age: 85
End: 2020-09-07
Payer: MEDICARE

## 2020-09-07 DIAGNOSIS — Z03.818 ENCOUNTER FOR OBSERVATION FOR SUSPECTED EXPOSURE TO OTHER BIOLOGICAL AGENTS RULED OUT: ICD-10-CM

## 2020-09-07 PROCEDURE — U0003 INFECTIOUS AGENT DETECTION BY NUCLEIC ACID (DNA OR RNA); SEVERE ACUTE RESPIRATORY SYNDROME CORONAVIRUS 2 (SARS-COV-2) (CORONAVIRUS DISEASE [COVID-19]), AMPLIFIED PROBE TECHNIQUE, MAKING USE OF HIGH THROUGHPUT TECHNOLOGIES AS DESCRIBED BY CMS-2020-01-R: HCPCS

## 2020-09-08 LAB — SARS-COV-2 RNA RESP QL NAA+PROBE: NOT DETECTED

## 2020-10-05 ENCOUNTER — PATIENT MESSAGE (OUTPATIENT)
Dept: ADMINISTRATIVE | Facility: HOSPITAL | Age: 85
End: 2020-10-05

## 2020-10-08 ENCOUNTER — LAB VISIT (OUTPATIENT)
Dept: LAB | Facility: OTHER | Age: 85
End: 2020-10-08
Payer: MEDICARE

## 2020-10-08 DIAGNOSIS — Z03.818 ENCOUNTER FOR OBSERVATION FOR SUSPECTED EXPOSURE TO OTHER BIOLOGICAL AGENTS RULED OUT: ICD-10-CM

## 2020-10-08 PROCEDURE — U0003 INFECTIOUS AGENT DETECTION BY NUCLEIC ACID (DNA OR RNA); SEVERE ACUTE RESPIRATORY SYNDROME CORONAVIRUS 2 (SARS-COV-2) (CORONAVIRUS DISEASE [COVID-19]), AMPLIFIED PROBE TECHNIQUE, MAKING USE OF HIGH THROUGHPUT TECHNOLOGIES AS DESCRIBED BY CMS-2020-01-R: HCPCS

## 2020-10-09 LAB — SARS-COV-2 RNA RESP QL NAA+PROBE: NOT DETECTED

## 2020-10-21 ENCOUNTER — TELEPHONE (OUTPATIENT)
Dept: INTERNAL MEDICINE | Facility: CLINIC | Age: 85
End: 2020-10-21

## 2020-10-21 ENCOUNTER — PATIENT MESSAGE (OUTPATIENT)
Dept: INTERNAL MEDICINE | Facility: CLINIC | Age: 85
End: 2020-10-21

## 2020-10-21 NOTE — LETTER
October 21, 2020    Mary Waters  [address]  [place] [address]             [facility]  [address]  [facility] [address]  Phone: [phone]  Fax: [phone] [facility] Social Security Administration:    I have been the Primary Care doctor for Mary Waters for several years. Due to medical conditions she was having difficulty attending to her personal and financial affairs. It would be in her best interest to add one of her children as a representative to manage things on her behalf.      If you have any questions or concerns, please don't hesitate to call.    Sincerely,         Omar Arthur MD

## 2020-10-21 NOTE — TELEPHONE ENCOUNTER
I printed letter for social security.  Please reach out to son on the portal message to see how to get this to him

## 2020-10-21 NOTE — TELEPHONE ENCOUNTER
----- Message from Ida Boyd sent at 10/21/2020  9:50 AM CDT -----  Regarding: Requesting a callback  Contact: Jt  Type:  Needs Medical Advice    Who Called: Pt son  Would the patient rather a call back or a response via [facility]? Call back  Best Call Back Number: [phone]  Additional Information: Requesting to get a letter to get him added to Social Security due to her issues

## 2020-10-21 NOTE — LETTER
October 22, 2020    Mary Waters  [address]  [place] [address]             [facility]  [address]  [facility] [address]  Phone: [phone]  Fax: [phone] [facility] Social Security Administration:    I have been the Primary Care doctor for Mary Waters for several years. Due to medical conditions she was having difficulty attending to her personal and financial affairs. It would be in her best interest to add one of her children as a representative to manage things on her behalf.      If you have any questions or concerns, please don't hesitate to call.    Sincerely,         Omar Arthur MD

## 2020-10-22 ENCOUNTER — PATIENT MESSAGE (OUTPATIENT)
Dept: INTERNAL MEDICINE | Facility: CLINIC | Age: 85
End: 2020-10-22

## 2020-11-05 ENCOUNTER — LAB VISIT (OUTPATIENT)
Dept: LAB | Facility: OTHER | Age: 85
End: 2020-11-05
Payer: MEDICARE

## 2020-11-05 DIAGNOSIS — Z03.818 ENCOUNTER FOR OBSERVATION FOR SUSPECTED EXPOSURE TO OTHER BIOLOGICAL AGENTS RULED OUT: ICD-10-CM

## 2020-11-05 LAB — SARS-COV-2 RNA RESP QL NAA+PROBE: NOT DETECTED

## 2020-11-05 PROCEDURE — U0003 INFECTIOUS AGENT DETECTION BY NUCLEIC ACID (DNA OR RNA); SEVERE ACUTE RESPIRATORY SYNDROME CORONAVIRUS 2 (SARS-COV-2) (CORONAVIRUS DISEASE [COVID-19]), AMPLIFIED PROBE TECHNIQUE, MAKING USE OF HIGH THROUGHPUT TECHNOLOGIES AS DESCRIBED BY CMS-2020-01-R: HCPCS

## 2020-11-19 ENCOUNTER — LAB VISIT (OUTPATIENT)
Dept: LAB | Facility: OTHER | Age: 85
End: 2020-11-19
Payer: MEDICARE

## 2020-11-19 DIAGNOSIS — Z03.818 ENCOUNTER FOR OBSERVATION FOR SUSPECTED EXPOSURE TO OTHER BIOLOGICAL AGENTS RULED OUT: ICD-10-CM

## 2020-11-19 PROCEDURE — U0003 INFECTIOUS AGENT DETECTION BY NUCLEIC ACID (DNA OR RNA); SEVERE ACUTE RESPIRATORY SYNDROME CORONAVIRUS 2 (SARS-COV-2) (CORONAVIRUS DISEASE [COVID-19]), AMPLIFIED PROBE TECHNIQUE, MAKING USE OF HIGH THROUGHPUT TECHNOLOGIES AS DESCRIBED BY CMS-2020-01-R: HCPCS

## 2020-11-22 LAB — SARS-COV-2 RNA RESP QL NAA+PROBE: NOT DETECTED

## 2020-11-24 ENCOUNTER — PATIENT MESSAGE (OUTPATIENT)
Dept: INTERNAL MEDICINE | Facility: CLINIC | Age: 85
End: 2020-11-24

## 2020-11-25 ENCOUNTER — LAB VISIT (OUTPATIENT)
Dept: LAB | Facility: OTHER | Age: 85
End: 2020-11-25
Payer: MEDICARE

## 2020-11-25 DIAGNOSIS — Z03.818 ENCOUNTER FOR OBSERVATION FOR SUSPECTED EXPOSURE TO OTHER BIOLOGICAL AGENTS RULED OUT: ICD-10-CM

## 2020-11-25 PROCEDURE — U0003 INFECTIOUS AGENT DETECTION BY NUCLEIC ACID (DNA OR RNA); SEVERE ACUTE RESPIRATORY SYNDROME CORONAVIRUS 2 (SARS-COV-2) (CORONAVIRUS DISEASE [COVID-19]), AMPLIFIED PROBE TECHNIQUE, MAKING USE OF HIGH THROUGHPUT TECHNOLOGIES AS DESCRIBED BY CMS-2020-01-R: HCPCS

## 2020-11-27 ENCOUNTER — LAB VISIT (OUTPATIENT)
Dept: LAB | Facility: OTHER | Age: 85
End: 2020-11-27
Payer: MEDICARE

## 2020-11-27 DIAGNOSIS — Z03.818 ENCOUNTER FOR OBSERVATION FOR SUSPECTED EXPOSURE TO OTHER BIOLOGICAL AGENTS RULED OUT: ICD-10-CM

## 2020-11-27 LAB — SARS-COV-2 RNA RESP QL NAA+PROBE: NOT DETECTED

## 2020-11-27 PROCEDURE — U0003 INFECTIOUS AGENT DETECTION BY NUCLEIC ACID (DNA OR RNA); SEVERE ACUTE RESPIRATORY SYNDROME CORONAVIRUS 2 (SARS-COV-2) (CORONAVIRUS DISEASE [COVID-19]), AMPLIFIED PROBE TECHNIQUE, MAKING USE OF HIGH THROUGHPUT TECHNOLOGIES AS DESCRIBED BY CMS-2020-01-R: HCPCS

## 2020-11-28 LAB — SARS-COV-2 RNA RESP QL NAA+PROBE: NOT DETECTED

## 2021-01-04 ENCOUNTER — PATIENT MESSAGE (OUTPATIENT)
Dept: ADMINISTRATIVE | Facility: HOSPITAL | Age: 86
End: 2021-01-04

## 2021-01-29 ENCOUNTER — LAB VISIT (OUTPATIENT)
Dept: LAB | Facility: OTHER | Age: 86
End: 2021-01-29
Payer: MEDICARE

## 2021-01-29 DIAGNOSIS — Z20.822 ENCOUNTER FOR LABORATORY TESTING FOR COVID-19 VIRUS: ICD-10-CM

## 2021-01-29 PROCEDURE — U0003 INFECTIOUS AGENT DETECTION BY NUCLEIC ACID (DNA OR RNA); SEVERE ACUTE RESPIRATORY SYNDROME CORONAVIRUS 2 (SARS-COV-2) (CORONAVIRUS DISEASE [COVID-19]), AMPLIFIED PROBE TECHNIQUE, MAKING USE OF HIGH THROUGHPUT TECHNOLOGIES AS DESCRIBED BY CMS-2020-01-R: HCPCS

## 2021-01-30 LAB — SARS-COV-2 RNA RESP QL NAA+PROBE: NOT DETECTED

## 2021-02-05 ENCOUNTER — LAB VISIT (OUTPATIENT)
Dept: LAB | Facility: OTHER | Age: 86
End: 2021-02-05
Payer: MEDICARE

## 2021-02-05 DIAGNOSIS — Z20.822 ENCOUNTER FOR LABORATORY TESTING FOR COVID-19 VIRUS: ICD-10-CM

## 2021-02-05 PROCEDURE — U0003 INFECTIOUS AGENT DETECTION BY NUCLEIC ACID (DNA OR RNA); SEVERE ACUTE RESPIRATORY SYNDROME CORONAVIRUS 2 (SARS-COV-2) (CORONAVIRUS DISEASE [COVID-19]), AMPLIFIED PROBE TECHNIQUE, MAKING USE OF HIGH THROUGHPUT TECHNOLOGIES AS DESCRIBED BY CMS-2020-01-R: HCPCS

## 2021-02-06 LAB — SARS-COV-2 RNA RESP QL NAA+PROBE: NOT DETECTED

## 2021-02-12 ENCOUNTER — LAB VISIT (OUTPATIENT)
Dept: LAB | Facility: OTHER | Age: 86
End: 2021-02-12
Attending: INTERNAL MEDICINE
Payer: MEDICARE

## 2021-02-12 DIAGNOSIS — Z20.822 ENCOUNTER FOR LABORATORY TESTING FOR COVID-19 VIRUS: ICD-10-CM

## 2021-02-12 PROCEDURE — U0003 INFECTIOUS AGENT DETECTION BY NUCLEIC ACID (DNA OR RNA); SEVERE ACUTE RESPIRATORY SYNDROME CORONAVIRUS 2 (SARS-COV-2) (CORONAVIRUS DISEASE [COVID-19]), AMPLIFIED PROBE TECHNIQUE, MAKING USE OF HIGH THROUGHPUT TECHNOLOGIES AS DESCRIBED BY CMS-2020-01-R: HCPCS

## 2021-02-13 LAB — SARS-COV-2 RNA RESP QL NAA+PROBE: NOT DETECTED

## 2021-02-19 ENCOUNTER — LAB VISIT (OUTPATIENT)
Dept: LAB | Facility: OTHER | Age: 86
End: 2021-02-19
Payer: MEDICARE

## 2021-02-19 DIAGNOSIS — Z20.822 ENCOUNTER FOR LABORATORY TESTING FOR COVID-19 VIRUS: ICD-10-CM

## 2021-02-19 PROCEDURE — U0003 INFECTIOUS AGENT DETECTION BY NUCLEIC ACID (DNA OR RNA); SEVERE ACUTE RESPIRATORY SYNDROME CORONAVIRUS 2 (SARS-COV-2) (CORONAVIRUS DISEASE [COVID-19]), AMPLIFIED PROBE TECHNIQUE, MAKING USE OF HIGH THROUGHPUT TECHNOLOGIES AS DESCRIBED BY CMS-2020-01-R: HCPCS

## 2021-02-20 LAB — SARS-COV-2 RNA RESP QL NAA+PROBE: NOT DETECTED

## 2021-02-26 ENCOUNTER — LAB VISIT (OUTPATIENT)
Dept: LAB | Facility: OTHER | Age: 86
End: 2021-02-26
Payer: MEDICARE

## 2021-02-26 DIAGNOSIS — Z20.822 ENCOUNTER FOR LABORATORY TESTING FOR COVID-19 VIRUS: ICD-10-CM

## 2021-02-26 LAB — SARS-COV-2 RNA RESP QL NAA+PROBE: NOT DETECTED

## 2021-02-26 PROCEDURE — U0003 INFECTIOUS AGENT DETECTION BY NUCLEIC ACID (DNA OR RNA); SEVERE ACUTE RESPIRATORY SYNDROME CORONAVIRUS 2 (SARS-COV-2) (CORONAVIRUS DISEASE [COVID-19]), AMPLIFIED PROBE TECHNIQUE, MAKING USE OF HIGH THROUGHPUT TECHNOLOGIES AS DESCRIBED BY CMS-2020-01-R: HCPCS

## 2021-03-05 ENCOUNTER — LAB VISIT (OUTPATIENT)
Dept: LAB | Facility: OTHER | Age: 86
End: 2021-03-05
Payer: MEDICARE

## 2021-03-05 DIAGNOSIS — Z20.822 ENCOUNTER FOR LABORATORY TESTING FOR COVID-19 VIRUS: ICD-10-CM

## 2021-03-05 PROCEDURE — U0003 INFECTIOUS AGENT DETECTION BY NUCLEIC ACID (DNA OR RNA); SEVERE ACUTE RESPIRATORY SYNDROME CORONAVIRUS 2 (SARS-COV-2) (CORONAVIRUS DISEASE [COVID-19]), AMPLIFIED PROBE TECHNIQUE, MAKING USE OF HIGH THROUGHPUT TECHNOLOGIES AS DESCRIBED BY CMS-2020-01-R: HCPCS | Performed by: NURSE PRACTITIONER

## 2021-03-06 LAB — SARS-COV-2 RNA RESP QL NAA+PROBE: NOT DETECTED

## 2021-03-12 ENCOUNTER — LAB VISIT (OUTPATIENT)
Dept: LAB | Facility: OTHER | Age: 86
End: 2021-03-12
Payer: MEDICARE

## 2021-03-12 DIAGNOSIS — Z20.822 ENCOUNTER FOR LABORATORY TESTING FOR COVID-19 VIRUS: ICD-10-CM

## 2021-03-12 PROCEDURE — U0003 INFECTIOUS AGENT DETECTION BY NUCLEIC ACID (DNA OR RNA); SEVERE ACUTE RESPIRATORY SYNDROME CORONAVIRUS 2 (SARS-COV-2) (CORONAVIRUS DISEASE [COVID-19]), AMPLIFIED PROBE TECHNIQUE, MAKING USE OF HIGH THROUGHPUT TECHNOLOGIES AS DESCRIBED BY CMS-2020-01-R: HCPCS | Performed by: NURSE PRACTITIONER

## 2021-03-14 LAB — SARS-COV-2 RNA RESP QL NAA+PROBE: NOT DETECTED

## 2021-03-22 DIAGNOSIS — Z20.822 ENCOUNTER FOR LABORATORY TESTING FOR COVID-19 VIRUS: ICD-10-CM

## 2021-03-26 ENCOUNTER — LAB VISIT (OUTPATIENT)
Dept: LAB | Facility: OTHER | Age: 86
End: 2021-03-26
Payer: MEDICARE

## 2021-03-26 DIAGNOSIS — Z20.822 ENCOUNTER FOR LABORATORY TESTING FOR COVID-19 VIRUS: ICD-10-CM

## 2021-03-26 PROCEDURE — U0003 INFECTIOUS AGENT DETECTION BY NUCLEIC ACID (DNA OR RNA); SEVERE ACUTE RESPIRATORY SYNDROME CORONAVIRUS 2 (SARS-COV-2) (CORONAVIRUS DISEASE [COVID-19]), AMPLIFIED PROBE TECHNIQUE, MAKING USE OF HIGH THROUGHPUT TECHNOLOGIES AS DESCRIBED BY CMS-2020-01-R: HCPCS | Performed by: NURSE PRACTITIONER

## 2021-03-27 LAB — SARS-COV-2 RNA RESP QL NAA+PROBE: NOT DETECTED

## 2021-03-31 ENCOUNTER — LAB VISIT (OUTPATIENT)
Dept: LAB | Facility: OTHER | Age: 86
End: 2021-03-31
Payer: MEDICARE

## 2021-03-31 DIAGNOSIS — Z20.822 ENCOUNTER FOR LABORATORY TESTING FOR COVID-19 VIRUS: ICD-10-CM

## 2021-03-31 PROCEDURE — U0003 INFECTIOUS AGENT DETECTION BY NUCLEIC ACID (DNA OR RNA); SEVERE ACUTE RESPIRATORY SYNDROME CORONAVIRUS 2 (SARS-COV-2) (CORONAVIRUS DISEASE [COVID-19]), AMPLIFIED PROBE TECHNIQUE, MAKING USE OF HIGH THROUGHPUT TECHNOLOGIES AS DESCRIBED BY CMS-2020-01-R: HCPCS | Performed by: NURSE PRACTITIONER

## 2021-04-01 LAB — SARS-COV-2 RNA RESP QL NAA+PROBE: NOT DETECTED

## 2021-04-05 ENCOUNTER — PATIENT MESSAGE (OUTPATIENT)
Dept: ADMINISTRATIVE | Facility: HOSPITAL | Age: 86
End: 2021-04-05

## 2021-04-09 ENCOUNTER — LAB VISIT (OUTPATIENT)
Dept: LAB | Facility: OTHER | Age: 86
End: 2021-04-09
Payer: MEDICARE

## 2021-04-09 DIAGNOSIS — Z20.822 ENCOUNTER FOR LABORATORY TESTING FOR COVID-19 VIRUS: ICD-10-CM

## 2021-04-09 PROCEDURE — U0003 INFECTIOUS AGENT DETECTION BY NUCLEIC ACID (DNA OR RNA); SEVERE ACUTE RESPIRATORY SYNDROME CORONAVIRUS 2 (SARS-COV-2) (CORONAVIRUS DISEASE [COVID-19]), AMPLIFIED PROBE TECHNIQUE, MAKING USE OF HIGH THROUGHPUT TECHNOLOGIES AS DESCRIBED BY CMS-2020-01-R: HCPCS | Performed by: NURSE PRACTITIONER

## 2021-04-10 LAB — SARS-COV-2 RNA RESP QL NAA+PROBE: NOT DETECTED

## 2021-04-16 ENCOUNTER — LAB VISIT (OUTPATIENT)
Dept: LAB | Facility: OTHER | Age: 86
End: 2021-04-16
Payer: MEDICARE

## 2021-04-16 DIAGNOSIS — Z20.822 ENCOUNTER FOR LABORATORY TESTING FOR COVID-19 VIRUS: ICD-10-CM

## 2021-04-16 PROCEDURE — U0003 INFECTIOUS AGENT DETECTION BY NUCLEIC ACID (DNA OR RNA); SEVERE ACUTE RESPIRATORY SYNDROME CORONAVIRUS 2 (SARS-COV-2) (CORONAVIRUS DISEASE [COVID-19]), AMPLIFIED PROBE TECHNIQUE, MAKING USE OF HIGH THROUGHPUT TECHNOLOGIES AS DESCRIBED BY CMS-2020-01-R: HCPCS | Performed by: NURSE PRACTITIONER

## 2021-04-17 LAB — SARS-COV-2 RNA RESP QL NAA+PROBE: NOT DETECTED

## 2021-04-22 ENCOUNTER — PES CALL (OUTPATIENT)
Dept: ADMINISTRATIVE | Facility: CLINIC | Age: 86
End: 2021-04-22

## 2021-04-23 ENCOUNTER — LAB VISIT (OUTPATIENT)
Dept: LAB | Facility: OTHER | Age: 86
End: 2021-04-23
Payer: MEDICARE

## 2021-04-23 DIAGNOSIS — Z20.822 ENCOUNTER FOR LABORATORY TESTING FOR COVID-19 VIRUS: ICD-10-CM

## 2021-04-23 PROCEDURE — U0003 INFECTIOUS AGENT DETECTION BY NUCLEIC ACID (DNA OR RNA); SEVERE ACUTE RESPIRATORY SYNDROME CORONAVIRUS 2 (SARS-COV-2) (CORONAVIRUS DISEASE [COVID-19]), AMPLIFIED PROBE TECHNIQUE, MAKING USE OF HIGH THROUGHPUT TECHNOLOGIES AS DESCRIBED BY CMS-2020-01-R: HCPCS | Performed by: NURSE PRACTITIONER

## 2021-04-24 LAB — SARS-COV-2 RNA RESP QL NAA+PROBE: NOT DETECTED

## 2021-04-30 ENCOUNTER — LAB VISIT (OUTPATIENT)
Dept: LAB | Facility: OTHER | Age: 86
End: 2021-04-30
Payer: MEDICARE

## 2021-04-30 DIAGNOSIS — Z20.822 ENCOUNTER FOR LABORATORY TESTING FOR COVID-19 VIRUS: ICD-10-CM

## 2021-04-30 PROCEDURE — U0003 INFECTIOUS AGENT DETECTION BY NUCLEIC ACID (DNA OR RNA); SEVERE ACUTE RESPIRATORY SYNDROME CORONAVIRUS 2 (SARS-COV-2) (CORONAVIRUS DISEASE [COVID-19]), AMPLIFIED PROBE TECHNIQUE, MAKING USE OF HIGH THROUGHPUT TECHNOLOGIES AS DESCRIBED BY CMS-2020-01-R: HCPCS | Performed by: NURSE PRACTITIONER

## 2021-05-01 LAB — SARS-COV-2 RNA RESP QL NAA+PROBE: NOT DETECTED

## 2021-05-07 ENCOUNTER — LAB VISIT (OUTPATIENT)
Dept: LAB | Facility: OTHER | Age: 86
End: 2021-05-07
Payer: MEDICARE

## 2021-05-07 DIAGNOSIS — Z20.822 ENCOUNTER FOR LABORATORY TESTING FOR COVID-19 VIRUS: ICD-10-CM

## 2021-05-07 PROCEDURE — U0003 INFECTIOUS AGENT DETECTION BY NUCLEIC ACID (DNA OR RNA); SEVERE ACUTE RESPIRATORY SYNDROME CORONAVIRUS 2 (SARS-COV-2) (CORONAVIRUS DISEASE [COVID-19]), AMPLIFIED PROBE TECHNIQUE, MAKING USE OF HIGH THROUGHPUT TECHNOLOGIES AS DESCRIBED BY CMS-2020-01-R: HCPCS | Performed by: NURSE PRACTITIONER

## 2021-05-08 LAB — SARS-COV-2 RNA RESP QL NAA+PROBE: NOT DETECTED

## 2021-05-14 ENCOUNTER — LAB VISIT (OUTPATIENT)
Dept: LAB | Facility: OTHER | Age: 86
End: 2021-05-14
Payer: MEDICARE

## 2021-05-14 DIAGNOSIS — Z20.822 ENCOUNTER FOR LABORATORY TESTING FOR COVID-19 VIRUS: ICD-10-CM

## 2021-05-14 PROCEDURE — U0003 INFECTIOUS AGENT DETECTION BY NUCLEIC ACID (DNA OR RNA); SEVERE ACUTE RESPIRATORY SYNDROME CORONAVIRUS 2 (SARS-COV-2) (CORONAVIRUS DISEASE [COVID-19]), AMPLIFIED PROBE TECHNIQUE, MAKING USE OF HIGH THROUGHPUT TECHNOLOGIES AS DESCRIBED BY CMS-2020-01-R: HCPCS | Performed by: NURSE PRACTITIONER

## 2021-05-15 LAB — SARS-COV-2 RNA RESP QL NAA+PROBE: NOT DETECTED

## 2021-05-21 ENCOUNTER — LAB VISIT (OUTPATIENT)
Dept: LAB | Facility: OTHER | Age: 86
End: 2021-05-21
Payer: MEDICARE

## 2021-05-21 DIAGNOSIS — Z20.822 ENCOUNTER FOR LABORATORY TESTING FOR COVID-19 VIRUS: ICD-10-CM

## 2021-05-21 PROCEDURE — U0003 INFECTIOUS AGENT DETECTION BY NUCLEIC ACID (DNA OR RNA); SEVERE ACUTE RESPIRATORY SYNDROME CORONAVIRUS 2 (SARS-COV-2) (CORONAVIRUS DISEASE [COVID-19]), AMPLIFIED PROBE TECHNIQUE, MAKING USE OF HIGH THROUGHPUT TECHNOLOGIES AS DESCRIBED BY CMS-2020-01-R: HCPCS | Performed by: NURSE PRACTITIONER

## 2021-05-22 LAB — SARS-COV-2 RNA RESP QL NAA+PROBE: NOT DETECTED

## 2021-05-28 ENCOUNTER — LAB VISIT (OUTPATIENT)
Dept: LAB | Facility: OTHER | Age: 86
End: 2021-05-28
Payer: MEDICARE

## 2021-05-28 DIAGNOSIS — Z20.822 ENCOUNTER FOR LABORATORY TESTING FOR COVID-19 VIRUS: ICD-10-CM

## 2021-05-28 LAB — SARS-COV-2 RNA RESP QL NAA+PROBE: NOT DETECTED

## 2021-05-28 PROCEDURE — U0003 INFECTIOUS AGENT DETECTION BY NUCLEIC ACID (DNA OR RNA); SEVERE ACUTE RESPIRATORY SYNDROME CORONAVIRUS 2 (SARS-COV-2) (CORONAVIRUS DISEASE [COVID-19]), AMPLIFIED PROBE TECHNIQUE, MAKING USE OF HIGH THROUGHPUT TECHNOLOGIES AS DESCRIBED BY CMS-2020-01-R: HCPCS | Performed by: NURSE PRACTITIONER

## 2021-06-04 ENCOUNTER — LAB VISIT (OUTPATIENT)
Dept: LAB | Facility: OTHER | Age: 86
End: 2021-06-04
Payer: MEDICARE

## 2021-06-04 DIAGNOSIS — Z20.822 ENCOUNTER FOR LABORATORY TESTING FOR COVID-19 VIRUS: ICD-10-CM

## 2021-06-04 PROCEDURE — U0003 INFECTIOUS AGENT DETECTION BY NUCLEIC ACID (DNA OR RNA); SEVERE ACUTE RESPIRATORY SYNDROME CORONAVIRUS 2 (SARS-COV-2) (CORONAVIRUS DISEASE [COVID-19]), AMPLIFIED PROBE TECHNIQUE, MAKING USE OF HIGH THROUGHPUT TECHNOLOGIES AS DESCRIBED BY CMS-2020-01-R: HCPCS | Performed by: NURSE PRACTITIONER

## 2021-06-05 LAB — SARS-COV-2 RNA RESP QL NAA+PROBE: NOT DETECTED

## 2021-06-11 ENCOUNTER — LAB VISIT (OUTPATIENT)
Dept: LAB | Facility: OTHER | Age: 86
End: 2021-06-11
Payer: MEDICARE

## 2021-06-11 DIAGNOSIS — Z20.822 ENCOUNTER FOR LABORATORY TESTING FOR COVID-19 VIRUS: ICD-10-CM

## 2021-06-11 PROCEDURE — U0003 INFECTIOUS AGENT DETECTION BY NUCLEIC ACID (DNA OR RNA); SEVERE ACUTE RESPIRATORY SYNDROME CORONAVIRUS 2 (SARS-COV-2) (CORONAVIRUS DISEASE [COVID-19]), AMPLIFIED PROBE TECHNIQUE, MAKING USE OF HIGH THROUGHPUT TECHNOLOGIES AS DESCRIBED BY CMS-2020-01-R: HCPCS | Performed by: NURSE PRACTITIONER

## 2021-06-12 LAB — SARS-COV-2 RNA RESP QL NAA+PROBE: NOT DETECTED

## 2021-06-18 ENCOUNTER — LAB VISIT (OUTPATIENT)
Dept: LAB | Facility: OTHER | Age: 86
End: 2021-06-18
Payer: MEDICARE

## 2021-06-18 DIAGNOSIS — Z20.822 ENCOUNTER FOR LABORATORY TESTING FOR COVID-19 VIRUS: ICD-10-CM

## 2021-06-18 PROCEDURE — U0003 INFECTIOUS AGENT DETECTION BY NUCLEIC ACID (DNA OR RNA); SEVERE ACUTE RESPIRATORY SYNDROME CORONAVIRUS 2 (SARS-COV-2) (CORONAVIRUS DISEASE [COVID-19]), AMPLIFIED PROBE TECHNIQUE, MAKING USE OF HIGH THROUGHPUT TECHNOLOGIES AS DESCRIBED BY CMS-2020-01-R: HCPCS | Performed by: NURSE PRACTITIONER

## 2021-06-19 LAB — SARS-COV-2 RNA RESP QL NAA+PROBE: NOT DETECTED

## 2021-06-25 ENCOUNTER — LAB VISIT (OUTPATIENT)
Dept: LAB | Facility: OTHER | Age: 86
End: 2021-06-25
Payer: MEDICARE

## 2021-06-25 DIAGNOSIS — Z20.822 ENCOUNTER FOR LABORATORY TESTING FOR COVID-19 VIRUS: ICD-10-CM

## 2021-06-25 PROCEDURE — U0003 INFECTIOUS AGENT DETECTION BY NUCLEIC ACID (DNA OR RNA); SEVERE ACUTE RESPIRATORY SYNDROME CORONAVIRUS 2 (SARS-COV-2) (CORONAVIRUS DISEASE [COVID-19]), AMPLIFIED PROBE TECHNIQUE, MAKING USE OF HIGH THROUGHPUT TECHNOLOGIES AS DESCRIBED BY CMS-2020-01-R: HCPCS | Performed by: NURSE PRACTITIONER

## 2021-06-26 LAB — SARS-COV-2 RNA RESP QL NAA+PROBE: NOT DETECTED

## 2021-07-02 ENCOUNTER — LAB VISIT (OUTPATIENT)
Dept: LAB | Facility: OTHER | Age: 86
End: 2021-07-02
Payer: MEDICARE

## 2021-07-02 DIAGNOSIS — Z20.822 ENCOUNTER FOR LABORATORY TESTING FOR COVID-19 VIRUS: ICD-10-CM

## 2021-07-02 PROCEDURE — U0003 INFECTIOUS AGENT DETECTION BY NUCLEIC ACID (DNA OR RNA); SEVERE ACUTE RESPIRATORY SYNDROME CORONAVIRUS 2 (SARS-COV-2) (CORONAVIRUS DISEASE [COVID-19]), AMPLIFIED PROBE TECHNIQUE, MAKING USE OF HIGH THROUGHPUT TECHNOLOGIES AS DESCRIBED BY CMS-2020-01-R: HCPCS | Performed by: NURSE PRACTITIONER

## 2021-07-03 LAB — SARS-COV-2 RNA RESP QL NAA+PROBE: NOT DETECTED

## 2021-07-07 ENCOUNTER — PATIENT MESSAGE (OUTPATIENT)
Dept: ADMINISTRATIVE | Facility: HOSPITAL | Age: 86
End: 2021-07-07

## 2021-07-09 ENCOUNTER — LAB VISIT (OUTPATIENT)
Dept: LAB | Facility: OTHER | Age: 86
End: 2021-07-09
Payer: MEDICARE

## 2021-07-09 DIAGNOSIS — Z20.822 ENCOUNTER FOR LABORATORY TESTING FOR COVID-19 VIRUS: ICD-10-CM

## 2021-07-09 PROCEDURE — U0003 INFECTIOUS AGENT DETECTION BY NUCLEIC ACID (DNA OR RNA); SEVERE ACUTE RESPIRATORY SYNDROME CORONAVIRUS 2 (SARS-COV-2) (CORONAVIRUS DISEASE [COVID-19]), AMPLIFIED PROBE TECHNIQUE, MAKING USE OF HIGH THROUGHPUT TECHNOLOGIES AS DESCRIBED BY CMS-2020-01-R: HCPCS | Performed by: NURSE PRACTITIONER

## 2021-07-10 LAB
SARS-COV-2 RNA RESP QL NAA+PROBE: NOT DETECTED
SARS-COV-2- CYCLE NUMBER: -1

## 2021-07-16 ENCOUNTER — LAB VISIT (OUTPATIENT)
Dept: LAB | Facility: OTHER | Age: 86
End: 2021-07-16
Payer: MEDICARE

## 2021-07-16 DIAGNOSIS — Z20.822 ENCOUNTER FOR LABORATORY TESTING FOR COVID-19 VIRUS: ICD-10-CM

## 2021-07-16 PROCEDURE — U0003 INFECTIOUS AGENT DETECTION BY NUCLEIC ACID (DNA OR RNA); SEVERE ACUTE RESPIRATORY SYNDROME CORONAVIRUS 2 (SARS-COV-2) (CORONAVIRUS DISEASE [COVID-19]), AMPLIFIED PROBE TECHNIQUE, MAKING USE OF HIGH THROUGHPUT TECHNOLOGIES AS DESCRIBED BY CMS-2020-01-R: HCPCS | Performed by: NURSE PRACTITIONER

## 2021-07-17 LAB
SARS-COV-2 RNA RESP QL NAA+PROBE: NOT DETECTED
SARS-COV-2- CYCLE NUMBER: -1

## 2021-07-30 ENCOUNTER — PATIENT MESSAGE (OUTPATIENT)
Dept: ADMINISTRATIVE | Facility: HOSPITAL | Age: 86
End: 2021-07-30

## 2021-07-30 ENCOUNTER — PATIENT MESSAGE (OUTPATIENT)
Dept: INTERNAL MEDICINE | Facility: CLINIC | Age: 86
End: 2021-07-30

## 2021-07-30 ENCOUNTER — LAB VISIT (OUTPATIENT)
Dept: LAB | Facility: OTHER | Age: 86
End: 2021-07-30
Payer: MEDICARE

## 2021-07-30 DIAGNOSIS — Z20.822 ENCOUNTER FOR LABORATORY TESTING FOR COVID-19 VIRUS: ICD-10-CM

## 2021-07-30 PROCEDURE — U0003 INFECTIOUS AGENT DETECTION BY NUCLEIC ACID (DNA OR RNA); SEVERE ACUTE RESPIRATORY SYNDROME CORONAVIRUS 2 (SARS-COV-2) (CORONAVIRUS DISEASE [COVID-19]), AMPLIFIED PROBE TECHNIQUE, MAKING USE OF HIGH THROUGHPUT TECHNOLOGIES AS DESCRIBED BY CMS-2020-01-R: HCPCS | Performed by: NURSE PRACTITIONER

## 2021-07-31 LAB
SARS-COV-2 RNA RESP QL NAA+PROBE: NOT DETECTED
SARS-COV-2- CYCLE NUMBER: -1

## 2021-08-03 ENCOUNTER — TELEPHONE (OUTPATIENT)
Dept: ADMINISTRATIVE | Facility: HOSPITAL | Age: 86
End: 2021-08-03

## 2021-08-03 ENCOUNTER — PATIENT OUTREACH (OUTPATIENT)
Dept: ADMINISTRATIVE | Facility: HOSPITAL | Age: 86
End: 2021-08-03

## 2021-08-03 DIAGNOSIS — M94.9 DISORDER OF BONE AND ARTICULAR CARTILAGE: Primary | ICD-10-CM

## 2021-08-03 DIAGNOSIS — M89.9 DISORDER OF BONE AND ARTICULAR CARTILAGE: Primary | ICD-10-CM

## 2021-08-03 DIAGNOSIS — Z78.0 ASYMPTOMATIC MENOPAUSAL STATE: ICD-10-CM

## 2021-08-20 ENCOUNTER — LAB VISIT (OUTPATIENT)
Dept: LAB | Facility: OTHER | Age: 86
End: 2021-08-20
Payer: MEDICARE

## 2021-08-20 DIAGNOSIS — Z20.822 ENCOUNTER FOR LABORATORY TESTING FOR COVID-19 VIRUS: ICD-10-CM

## 2021-08-20 PROCEDURE — U0003 INFECTIOUS AGENT DETECTION BY NUCLEIC ACID (DNA OR RNA); SEVERE ACUTE RESPIRATORY SYNDROME CORONAVIRUS 2 (SARS-COV-2) (CORONAVIRUS DISEASE [COVID-19]), AMPLIFIED PROBE TECHNIQUE, MAKING USE OF HIGH THROUGHPUT TECHNOLOGIES AS DESCRIBED BY CMS-2020-01-R: HCPCS | Performed by: NURSE PRACTITIONER

## 2021-08-23 LAB
SARS-COV-2 RNA RESP QL NAA+PROBE: NOT DETECTED
SARS-COV-2- CYCLE NUMBER: -1

## 2021-08-27 ENCOUNTER — LAB VISIT (OUTPATIENT)
Dept: LAB | Facility: OTHER | Age: 86
End: 2021-08-27
Payer: MEDICARE

## 2021-08-27 DIAGNOSIS — Z20.822 ENCOUNTER FOR LABORATORY TESTING FOR COVID-19 VIRUS: ICD-10-CM

## 2021-08-27 PROCEDURE — U0003 INFECTIOUS AGENT DETECTION BY NUCLEIC ACID (DNA OR RNA); SEVERE ACUTE RESPIRATORY SYNDROME CORONAVIRUS 2 (SARS-COV-2) (CORONAVIRUS DISEASE [COVID-19]), AMPLIFIED PROBE TECHNIQUE, MAKING USE OF HIGH THROUGHPUT TECHNOLOGIES AS DESCRIBED BY CMS-2020-01-R: HCPCS | Performed by: NURSE PRACTITIONER

## 2021-08-30 LAB
SARS-COV-2 RNA RESP QL NAA+PROBE: NOT DETECTED
SARS-COV-2- CYCLE NUMBER: NORMAL

## 2021-10-04 ENCOUNTER — PATIENT MESSAGE (OUTPATIENT)
Dept: ADMINISTRATIVE | Facility: HOSPITAL | Age: 86
End: 2021-10-04

## 2021-10-08 ENCOUNTER — LAB VISIT (OUTPATIENT)
Dept: LAB | Facility: OTHER | Age: 86
End: 2021-10-08
Payer: MEDICARE

## 2021-10-08 DIAGNOSIS — Z20.822 ENCOUNTER FOR LABORATORY TESTING FOR COVID-19 VIRUS: ICD-10-CM

## 2021-10-08 PROCEDURE — U0003 INFECTIOUS AGENT DETECTION BY NUCLEIC ACID (DNA OR RNA); SEVERE ACUTE RESPIRATORY SYNDROME CORONAVIRUS 2 (SARS-COV-2) (CORONAVIRUS DISEASE [COVID-19]), AMPLIFIED PROBE TECHNIQUE, MAKING USE OF HIGH THROUGHPUT TECHNOLOGIES AS DESCRIBED BY CMS-2020-01-R: HCPCS | Performed by: NURSE PRACTITIONER

## 2021-10-09 LAB
SARS-COV-2 RNA RESP QL NAA+PROBE: NOT DETECTED
SARS-COV-2- CYCLE NUMBER: NORMAL

## 2021-10-18 DIAGNOSIS — Z20.822 ENCOUNTER FOR LABORATORY TESTING FOR COVID-19 VIRUS: ICD-10-CM

## 2021-10-22 ENCOUNTER — LAB VISIT (OUTPATIENT)
Dept: LAB | Facility: OTHER | Age: 86
End: 2021-10-22
Payer: MEDICARE

## 2021-10-22 DIAGNOSIS — Z20.822 ENCOUNTER FOR LABORATORY TESTING FOR COVID-19 VIRUS: ICD-10-CM

## 2021-10-22 PROCEDURE — U0003 INFECTIOUS AGENT DETECTION BY NUCLEIC ACID (DNA OR RNA); SEVERE ACUTE RESPIRATORY SYNDROME CORONAVIRUS 2 (SARS-COV-2) (CORONAVIRUS DISEASE [COVID-19]), AMPLIFIED PROBE TECHNIQUE, MAKING USE OF HIGH THROUGHPUT TECHNOLOGIES AS DESCRIBED BY CMS-2020-01-R: HCPCS | Performed by: NURSE PRACTITIONER

## 2021-10-23 LAB
SARS-COV-2 RNA RESP QL NAA+PROBE: NOT DETECTED
SARS-COV-2- CYCLE NUMBER: NORMAL

## 2021-10-29 ENCOUNTER — LAB VISIT (OUTPATIENT)
Dept: LAB | Facility: OTHER | Age: 86
End: 2021-10-29
Payer: MEDICARE

## 2021-10-29 DIAGNOSIS — Z20.822 ENCOUNTER FOR LABORATORY TESTING FOR COVID-19 VIRUS: ICD-10-CM

## 2021-10-29 LAB
SARS-COV-2 RNA RESP QL NAA+PROBE: NOT DETECTED
SARS-COV-2- CYCLE NUMBER: NORMAL

## 2021-10-29 PROCEDURE — U0003 INFECTIOUS AGENT DETECTION BY NUCLEIC ACID (DNA OR RNA); SEVERE ACUTE RESPIRATORY SYNDROME CORONAVIRUS 2 (SARS-COV-2) (CORONAVIRUS DISEASE [COVID-19]), AMPLIFIED PROBE TECHNIQUE, MAKING USE OF HIGH THROUGHPUT TECHNOLOGIES AS DESCRIBED BY CMS-2020-01-R: HCPCS | Performed by: NURSE PRACTITIONER

## 2021-11-05 ENCOUNTER — LAB VISIT (OUTPATIENT)
Dept: LAB | Facility: OTHER | Age: 86
End: 2021-11-05
Payer: MEDICARE

## 2021-11-05 DIAGNOSIS — Z20.822 ENCOUNTER FOR LABORATORY TESTING FOR COVID-19 VIRUS: ICD-10-CM

## 2021-11-05 PROCEDURE — U0003 INFECTIOUS AGENT DETECTION BY NUCLEIC ACID (DNA OR RNA); SEVERE ACUTE RESPIRATORY SYNDROME CORONAVIRUS 2 (SARS-COV-2) (CORONAVIRUS DISEASE [COVID-19]), AMPLIFIED PROBE TECHNIQUE, MAKING USE OF HIGH THROUGHPUT TECHNOLOGIES AS DESCRIBED BY CMS-2020-01-R: HCPCS | Performed by: NURSE PRACTITIONER

## 2021-11-06 LAB
SARS-COV-2 RNA RESP QL NAA+PROBE: NOT DETECTED
SARS-COV-2- CYCLE NUMBER: NORMAL

## 2021-12-03 ENCOUNTER — LAB VISIT (OUTPATIENT)
Dept: LAB | Facility: OTHER | Age: 86
End: 2021-12-03
Payer: MEDICARE

## 2021-12-03 DIAGNOSIS — Z20.822 ENCOUNTER FOR LABORATORY TESTING FOR COVID-19 VIRUS: ICD-10-CM

## 2021-12-03 PROCEDURE — U0003 INFECTIOUS AGENT DETECTION BY NUCLEIC ACID (DNA OR RNA); SEVERE ACUTE RESPIRATORY SYNDROME CORONAVIRUS 2 (SARS-COV-2) (CORONAVIRUS DISEASE [COVID-19]), AMPLIFIED PROBE TECHNIQUE, MAKING USE OF HIGH THROUGHPUT TECHNOLOGIES AS DESCRIBED BY CMS-2020-01-R: HCPCS | Performed by: NURSE PRACTITIONER

## 2021-12-04 LAB
SARS-COV-2 RNA RESP QL NAA+PROBE: NOT DETECTED
SARS-COV-2- CYCLE NUMBER: NORMAL

## 2022-01-07 ENCOUNTER — LAB VISIT (OUTPATIENT)
Dept: LAB | Facility: OTHER | Age: 87
End: 2022-01-07
Payer: MEDICARE

## 2022-01-07 DIAGNOSIS — Z20.822 ENCOUNTER FOR LABORATORY TESTING FOR COVID-19 VIRUS: ICD-10-CM

## 2022-01-07 PROCEDURE — U0003 INFECTIOUS AGENT DETECTION BY NUCLEIC ACID (DNA OR RNA); SEVERE ACUTE RESPIRATORY SYNDROME CORONAVIRUS 2 (SARS-COV-2) (CORONAVIRUS DISEASE [COVID-19]), AMPLIFIED PROBE TECHNIQUE, MAKING USE OF HIGH THROUGHPUT TECHNOLOGIES AS DESCRIBED BY CMS-2020-01-R: HCPCS | Mod: ST72 | Performed by: NURSE PRACTITIONER

## 2022-01-11 LAB
SARS-COV-2 RNA RESP QL NAA+PROBE: NORMAL
TEST PERFORMANCE INFO SPEC: NORMAL

## 2022-01-14 ENCOUNTER — LAB VISIT (OUTPATIENT)
Dept: LAB | Facility: OTHER | Age: 87
End: 2022-01-14
Payer: MEDICARE

## 2022-01-14 DIAGNOSIS — Z20.822 ENCOUNTER FOR LABORATORY TESTING FOR COVID-19 VIRUS: ICD-10-CM

## 2022-01-14 PROCEDURE — U0003 INFECTIOUS AGENT DETECTION BY NUCLEIC ACID (DNA OR RNA); SEVERE ACUTE RESPIRATORY SYNDROME CORONAVIRUS 2 (SARS-COV-2) (CORONAVIRUS DISEASE [COVID-19]), AMPLIFIED PROBE TECHNIQUE, MAKING USE OF HIGH THROUGHPUT TECHNOLOGIES AS DESCRIBED BY CMS-2020-01-R: HCPCS | Performed by: NURSE PRACTITIONER

## 2022-01-15 LAB
SARS-COV-2 RNA RESP QL NAA+PROBE: NOT DETECTED
SARS-COV-2- CYCLE NUMBER: NORMAL

## 2022-01-21 ENCOUNTER — LAB VISIT (OUTPATIENT)
Dept: LAB | Facility: OTHER | Age: 87
End: 2022-01-21
Payer: MEDICARE

## 2022-01-21 DIAGNOSIS — Z20.822 ENCOUNTER FOR LABORATORY TESTING FOR COVID-19 VIRUS: ICD-10-CM

## 2022-01-21 LAB
SARS-COV-2 RNA RESP QL NAA+PROBE: NOT DETECTED
SARS-COV-2- CYCLE NUMBER: NORMAL

## 2022-01-21 PROCEDURE — U0003 INFECTIOUS AGENT DETECTION BY NUCLEIC ACID (DNA OR RNA); SEVERE ACUTE RESPIRATORY SYNDROME CORONAVIRUS 2 (SARS-COV-2) (CORONAVIRUS DISEASE [COVID-19]), AMPLIFIED PROBE TECHNIQUE, MAKING USE OF HIGH THROUGHPUT TECHNOLOGIES AS DESCRIBED BY CMS-2020-01-R: HCPCS | Performed by: EMERGENCY MEDICINE

## 2022-01-26 ENCOUNTER — PATIENT MESSAGE (OUTPATIENT)
Dept: ADMINISTRATIVE | Facility: HOSPITAL | Age: 87
End: 2022-01-26
Payer: MEDICARE

## 2022-02-18 ENCOUNTER — LAB VISIT (OUTPATIENT)
Dept: LAB | Facility: OTHER | Age: 87
End: 2022-02-18
Payer: MEDICARE

## 2022-02-18 DIAGNOSIS — Z20.822 ENCOUNTER FOR LABORATORY TESTING FOR COVID-19 VIRUS: ICD-10-CM

## 2022-02-18 LAB — SARS-COV-2 RNA RESP QL NAA+PROBE: NOT DETECTED

## 2022-02-18 PROCEDURE — U0003 INFECTIOUS AGENT DETECTION BY NUCLEIC ACID (DNA OR RNA); SEVERE ACUTE RESPIRATORY SYNDROME CORONAVIRUS 2 (SARS-COV-2) (CORONAVIRUS DISEASE [COVID-19]), AMPLIFIED PROBE TECHNIQUE, MAKING USE OF HIGH THROUGHPUT TECHNOLOGIES AS DESCRIBED BY CMS-2020-01-R: HCPCS | Performed by: EMERGENCY MEDICINE

## 2022-02-25 ENCOUNTER — LAB VISIT (OUTPATIENT)
Dept: LAB | Facility: OTHER | Age: 87
End: 2022-02-25
Payer: MEDICARE

## 2022-02-25 DIAGNOSIS — Z20.822 ENCOUNTER FOR LABORATORY TESTING FOR COVID-19 VIRUS: ICD-10-CM

## 2022-02-25 PROCEDURE — U0003 INFECTIOUS AGENT DETECTION BY NUCLEIC ACID (DNA OR RNA); SEVERE ACUTE RESPIRATORY SYNDROME CORONAVIRUS 2 (SARS-COV-2) (CORONAVIRUS DISEASE [COVID-19]), AMPLIFIED PROBE TECHNIQUE, MAKING USE OF HIGH THROUGHPUT TECHNOLOGIES AS DESCRIBED BY CMS-2020-01-R: HCPCS | Performed by: EMERGENCY MEDICINE

## 2022-02-26 LAB
SARS-COV-2 RNA RESP QL NAA+PROBE: NOT DETECTED
SARS-COV-2- CYCLE NUMBER: NORMAL

## 2022-03-16 ENCOUNTER — PATIENT MESSAGE (OUTPATIENT)
Dept: ADMINISTRATIVE | Facility: HOSPITAL | Age: 87
End: 2022-03-16
Payer: MEDICARE